# Patient Record
Sex: FEMALE | Race: WHITE | NOT HISPANIC OR LATINO | Employment: FULL TIME | URBAN - METROPOLITAN AREA
[De-identification: names, ages, dates, MRNs, and addresses within clinical notes are randomized per-mention and may not be internally consistent; named-entity substitution may affect disease eponyms.]

---

## 2023-03-05 DIAGNOSIS — K21.9 GASTRO-ESOPHAGEAL REFLUX DISEASE WITHOUT ESOPHAGITIS: ICD-10-CM

## 2023-03-07 RX ORDER — OMEPRAZOLE 40 MG/1
CAPSULE, DELAYED RELEASE ORAL
Qty: 30 CAPSULE | Refills: 5 | Status: SHIPPED | OUTPATIENT
Start: 2023-03-07 | End: 2023-07-24 | Stop reason: SDUPTHER

## 2023-07-24 ENCOUNTER — OFFICE VISIT (OUTPATIENT)
Dept: PRIMARY CARE | Facility: CLINIC | Age: 48
End: 2023-07-24
Payer: COMMERCIAL

## 2023-07-24 VITALS
DIASTOLIC BLOOD PRESSURE: 80 MMHG | BODY MASS INDEX: 28.56 KG/M2 | OXYGEN SATURATION: 97 % | WEIGHT: 182 LBS | TEMPERATURE: 97.7 F | RESPIRATION RATE: 18 BRPM | SYSTOLIC BLOOD PRESSURE: 112 MMHG | HEIGHT: 67 IN | HEART RATE: 91 BPM

## 2023-07-24 DIAGNOSIS — K21.9 GASTRO-ESOPHAGEAL REFLUX DISEASE WITHOUT ESOPHAGITIS: ICD-10-CM

## 2023-07-24 DIAGNOSIS — I49.3 PVC (PREMATURE VENTRICULAR CONTRACTION): ICD-10-CM

## 2023-07-24 DIAGNOSIS — F41.9 ANXIETY: ICD-10-CM

## 2023-07-24 DIAGNOSIS — F32.A DEPRESSION, UNSPECIFIED DEPRESSION TYPE: ICD-10-CM

## 2023-07-24 DIAGNOSIS — N60.02 BREAST CYST, LEFT: ICD-10-CM

## 2023-07-24 DIAGNOSIS — R00.2 HEART PALPITATIONS: Primary | ICD-10-CM

## 2023-07-24 DIAGNOSIS — F41.1 GENERALIZED ANXIETY DISORDER: ICD-10-CM

## 2023-07-24 DIAGNOSIS — M25.50 ARTHRALGIA, UNSPECIFIED JOINT: ICD-10-CM

## 2023-07-24 PROBLEM — I83.813 VARICOSE VEINS OF BOTH LOWER EXTREMITIES WITH PAIN: Status: ACTIVE | Noted: 2023-07-24

## 2023-07-24 PROBLEM — M54.12 CERVICAL RADICULOPATHY: Status: ACTIVE | Noted: 2023-07-24

## 2023-07-24 PROBLEM — N28.1 KIDNEY CYST, ACQUIRED: Status: ACTIVE | Noted: 2023-07-24

## 2023-07-24 PROBLEM — R16.1 SPLENOMEGALY: Status: ACTIVE | Noted: 2023-07-24

## 2023-07-24 PROBLEM — E53.8 VITAMIN B12 DEFICIENCY: Status: ACTIVE | Noted: 2023-07-24

## 2023-07-24 PROBLEM — K76.9 LIVER LESION: Status: ACTIVE | Noted: 2023-07-24

## 2023-07-24 PROBLEM — M54.81 OCCIPITAL NEURALGIA: Status: ACTIVE | Noted: 2023-07-24

## 2023-07-24 PROBLEM — E04.1 THYROID NODULE: Status: ACTIVE | Noted: 2023-07-24

## 2023-07-24 PROBLEM — M62.838 MUSCLE SPASTICITY: Status: ACTIVE | Noted: 2023-07-24

## 2023-07-24 PROCEDURE — 99214 OFFICE O/P EST MOD 30 MIN: CPT | Performed by: FAMILY MEDICINE

## 2023-07-24 PROCEDURE — 1036F TOBACCO NON-USER: CPT | Performed by: FAMILY MEDICINE

## 2023-07-24 RX ORDER — OMEPRAZOLE 40 MG/1
40 CAPSULE, DELAYED RELEASE ORAL DAILY
Qty: 90 CAPSULE | Refills: 1 | Status: SHIPPED | OUTPATIENT
Start: 2023-07-24 | End: 2024-06-07 | Stop reason: SDUPTHER

## 2023-07-24 RX ORDER — VILAZODONE HYDROCHLORIDE 20 MG/1
20 TABLET ORAL DAILY
Qty: 90 TABLET | Refills: 1 | Status: CANCELLED | OUTPATIENT
Start: 2023-07-24 | End: 2024-01-20

## 2023-07-24 RX ORDER — VILAZODONE HYDROCHLORIDE 40 MG/1
40 TABLET ORAL DAILY
Qty: 30 TABLET | Refills: 5 | Status: SHIPPED | OUTPATIENT
Start: 2023-07-24 | End: 2024-02-28

## 2023-07-24 ASSESSMENT — PATIENT HEALTH QUESTIONNAIRE - PHQ9
1. LITTLE INTEREST OR PLEASURE IN DOING THINGS: NOT AT ALL
2. FEELING DOWN, DEPRESSED OR HOPELESS: NOT AT ALL
SUM OF ALL RESPONSES TO PHQ9 QUESTIONS 1 AND 2: 0

## 2023-07-24 NOTE — PROGRESS NOTES
Subjective   Patient ID: Vianney Jacques is a 47 y.o. female who presents for Anxiety, Palpitations, and Joint Pain.    HPI    Patient presents today for anxiety follow up.  Has been taking Viibryd.  Medication is approximately 70-80% effective.  Denies any side effectives to medication.  Requesting refill today.    She would like to discuss palpitations.  She states a month ago, she was to the point of almost passing out.  She states her vitals were taken at work and her BP was slightly elevated but otherwise normal.  States this initial episode lasted approximately 15 seconds.  Then she coughed and she was better.  Since then, when she gets the palpitations she gets lightheaded.  As fast as it comes on, it goes away.  She is drinking enough fluids.  Denies a lot of salt in diet.  Last EKG was 2021.    She has been having a lot of joint pain.  Described as stiffness.  She is not sure if this is do to age.  Denies family history of RA.  If it gets really bad, she will take Aleve or Motrin.    She wanted to discuss mammogram.  She states she was told there was cyst in her breast.  She did have an ultrasound also.  Sister had breast cancer.  BRCA gene negative.  She states where the cysts were identified, she has discomfort.  Left breast.  Denies any lumps felt.  She was told to do a 1 year follow up mammogram.      Review of systems  ; Patient seen today for exam denies any problems with headaches or vision, denies any shortness of breath chest pain nausea or vomiting, no black stool no blood in the stool no heartburn type symptoms denies any problems with constipation or diarrhea, and no dysuria-type symptoms    The patient's allergies medications were reviewed with them today    The patient's social family and surgical history or also reviewed here today, along with her past medical history.     Objective     Alert and active in  no acute distress  HEENT TMs clear oropharynx negative nares clear no drainage noted neck  "supple  With no adenopathy   Heart regular rate and rhythm without murmur and no carotid bruits  Lungs- clear to auscultation bilaterally, no wheeze or rhonchi noted  Thyroid -negative masses or nodularity  Abdomen- soft times four quadrants , bowel sounds positive no masses or organomegaly, negative tenderness guarding or rebound  Neurological exam unremarkable- DTRs in upper and lower extremities within normal limits.   skin -no lesions noted      /80 (BP Location: Left arm, Patient Position: Sitting, BP Cuff Size: Adult)   Pulse 91   Temp 36.5 °C (97.7 °F) (Temporal)   Resp 18   Ht 1.702 m (5' 7\")   Wt 82.6 kg (182 lb)   SpO2 97%   BMI 28.51 kg/m²     No Known Allergies    Assessment/Plan   Problem List Items Addressed This Visit       Anxiety disorder    Arthralgia    Depression    Relevant Medications    vilazodone (Viibryd) 40 mg tablet    PVC (premature ventricular contraction)    Relevant Orders    Referral to Cardiology     Other Visit Diagnoses       Heart palpitations    -  Primary    Gastro-esophageal reflux disease without esophagitis        Relevant Medications    omeprazole (PriLOSEC) 40 mg DR capsule    Breast cyst, left        Relevant Orders    Referral to General Surgery    Anxiety        Relevant Medications    vilazodone (Viibryd) 40 mg tablet        We had a long discussion regarding her palpitations she will monitor years ago and everything was okay but is concerning now that when she gets the palpitations she gets some lightheadedness never had that before her family had PAF but this time she has no signs of A-fib we will have her see cardiology to wear some monitoring    Also little more anxiety with stressors at work at home also her sister having breast cancer now her 29 29-year-old niece has breast cancer    Reviewed her ultrasound and mammogram showing some cysts we will have her get a second opinion from Dr. Mcfarlane I think she will feel better hopefully come in to discuss " whether she should be doing MRIs 1 year mammograms 1 year to be more proactive anything she can do would be helpful, and we look forward to her recommendations      We will have her increase the Viibryd to 40 mg each day and see if that helps somewhat.  The next couple weeks    At this time we reviewed her arthritis panel they are all negative except her ALEISHA which was low she is very active encouraged her to do so    She can try supplement for 4 to 6 weeks if she wants to help with joint pains if they are not helpful can stop it after she let me know how things go over the next few weeks        If anything worsens or changes please call us at once, follow up in the office as planned.

## 2023-09-11 DIAGNOSIS — F32.A DEPRESSION, UNSPECIFIED DEPRESSION TYPE: ICD-10-CM

## 2023-09-11 RX ORDER — VILAZODONE HYDROCHLORIDE 20 MG/1
20 TABLET ORAL DAILY
Qty: 90 TABLET | Refills: 0 | Status: SHIPPED | OUTPATIENT
Start: 2023-09-11 | End: 2023-11-03 | Stop reason: ALTCHOICE

## 2023-10-31 PROBLEM — M41.9 SCOLIOSIS OF THORACIC SPINE: Status: ACTIVE | Noted: 2018-02-20

## 2023-10-31 PROBLEM — M54.2 NECK PAIN: Status: ACTIVE | Noted: 2018-02-20

## 2023-11-03 ENCOUNTER — OFFICE VISIT (OUTPATIENT)
Dept: VASCULAR SURGERY | Facility: CLINIC | Age: 48
End: 2023-11-03
Payer: COMMERCIAL

## 2023-11-03 VITALS
DIASTOLIC BLOOD PRESSURE: 80 MMHG | HEART RATE: 95 BPM | SYSTOLIC BLOOD PRESSURE: 126 MMHG | BODY MASS INDEX: 29.73 KG/M2 | WEIGHT: 185 LBS | HEIGHT: 66 IN

## 2023-11-03 DIAGNOSIS — I83.813 VARICOSE VEINS OF BOTH LOWER EXTREMITIES WITH PAIN: Primary | ICD-10-CM

## 2023-11-03 PROCEDURE — 99203 OFFICE O/P NEW LOW 30 MIN: CPT | Performed by: SURGERY

## 2023-11-03 PROCEDURE — 1036F TOBACCO NON-USER: CPT | Performed by: SURGERY

## 2023-11-03 RX ORDER — LIDOCAINE HCL 4 G/100G
CREAM TOPICAL DAILY PRN
COMMUNITY
Start: 2020-02-04 | End: 2024-01-17 | Stop reason: WASHOUT

## 2023-11-03 RX ORDER — PHENOL 1.4 %
AEROSOL, SPRAY (ML) MUCOUS MEMBRANE
COMMUNITY

## 2023-11-03 RX ORDER — CYANOCOBALAMIN 1000 UG/ML
1 INJECTION, SOLUTION INTRAMUSCULAR; SUBCUTANEOUS
COMMUNITY
Start: 2021-12-07 | End: 2024-01-17 | Stop reason: WASHOUT

## 2023-11-03 ASSESSMENT — ENCOUNTER SYMPTOMS
PSYCHIATRIC NEGATIVE: 1
EYES NEGATIVE: 1
WOUND: 0
GASTROINTESTINAL NEGATIVE: 1
DIZZINESS: 0
HEADACHES: 0
BRUISES/BLEEDS EASILY: 0
NUMBNESS: 0
SPEECH DIFFICULTY: 0
CONSTITUTIONAL NEGATIVE: 1
COUGH: 0
WEAKNESS: 0
BACK PAIN: 0
ENDOCRINE NEGATIVE: 1
COLOR CHANGE: 0
SHORTNESS OF BREATH: 0

## 2023-11-03 NOTE — PROGRESS NOTES
History Of Present Illness  Vianney Jacques is a 48 y.o. female presenting for evaluation of varicose veins of both legs.  She states this has been an issue for several years.  She notes aching and discomfort in her legs that gets worse throughout the day.  She also notes that her legs bother her even when lying down at night.  She denies any swelling or edema.  She notes multiple spider veins and also some prominent varicose veins that occasionally bulge.  She denies any history of DVT.  She does have family history of varicose veins in her mother.  She has no significant past medical history.     Past Medical History  She has a past medical history of Abnormal posture (03/09/2020), Acute maxillary sinusitis, unspecified (06/24/2020), Contact with and (suspected) exposure to covid-19 (09/17/2020), Contusion of unspecified front wall of thorax, initial encounter (02/24/2021), Encounter for immunization (11/16/2020), Encounter for screening for malignant neoplasm of colon (10/01/2021), Other chest pain (06/02/2021), Other conditions influencing health status (09/17/2020), Other specified postprocedural states (11/09/2021), Personal history of other diseases of the musculoskeletal system and connective tissue (12/03/2021), Personal history of other diseases of the musculoskeletal system and connective tissue (03/09/2020), Personal history of other diseases of the musculoskeletal system and connective tissue (03/09/2020), Personal history of other diseases of the respiratory system (06/24/2020), Personal history of other specified conditions (01/20/2021), Personal history of other specified conditions (10/01/2021), Personal history of other specified conditions (01/20/2021), and Personal history of other specified conditions (10/01/2021).    Surgical History  She has a past surgical history that includes Other surgical history (02/04/2020) and Other surgical history (02/04/2020).     Social History  She reports that she  has never smoked. She has never used smokeless tobacco. She reports current alcohol use. She reports that she does not use drugs.    Family History  No family history on file.     Allergies  Patient has no known allergies.    Review of Systems   Constitutional: Negative.    HENT: Negative.     Eyes: Negative.    Respiratory:  Negative for cough and shortness of breath.    Cardiovascular:  Negative for chest pain and leg swelling.   Gastrointestinal: Negative.    Endocrine: Negative.    Genitourinary: Negative.    Musculoskeletal:  Negative for back pain and gait problem.   Skin:  Negative for color change, pallor and wound.   Neurological:  Negative for dizziness, syncope, speech difficulty, weakness, numbness and headaches.   Hematological:  Does not bruise/bleed easily.   Psychiatric/Behavioral: Negative.          Physical Exam  Constitutional:       General: She is not in acute distress.     Appearance: Normal appearance. She is normal weight.   HENT:      Head: Normocephalic and atraumatic.   Eyes:      Extraocular Movements: Extraocular movements intact.      Conjunctiva/sclera: Conjunctivae normal.      Pupils: Pupils are equal, round, and reactive to light.   Neck:      Vascular: No carotid bruit.   Cardiovascular:      Rate and Rhythm: Normal rate and regular rhythm.      Pulses: Normal pulses.      Heart sounds: Normal heart sounds.      Comments: Multiple scattered spider veins and few small varicose veins of bilateral distal thigh and calf  Pulmonary:      Effort: Pulmonary effort is normal.      Breath sounds: Normal breath sounds.   Abdominal:      General: Abdomen is flat. Bowel sounds are normal.      Palpations: Abdomen is soft.   Musculoskeletal:         General: No swelling. Normal range of motion.      Cervical back: Normal range of motion. No tenderness.   Skin:     General: Skin is warm and dry.   Neurological:      General: No focal deficit present.      Mental Status: She is alert and oriented  "to person, place, and time.      Cranial Nerves: No cranial nerve deficit.      Sensory: No sensory deficit.      Motor: No weakness.   Psychiatric:         Mood and Affect: Mood normal.         Behavior: Behavior normal.          Last Recorded Vitals  Blood pressure 126/80, pulse 95, height 1.676 m (5' 6\"), weight 83.9 kg (185 lb).    Relevant Results      Current Outpatient Medications:     cyanocobalamin (Vitamin B-12) 1,000 mcg/mL injection, Inject 1 mL (1,000 mcg) into the muscle., Disp: , Rfl:     lidocaine (lidocaine HCL) 4 % cream, once daily as needed., Disp: , Rfl:     omeprazole (PriLOSEC) 40 mg DR capsule, Take 1 capsule (40 mg) by mouth once daily. Do not crush or chew., Disp: 90 capsule, Rfl: 1    vilazodone (Viibryd) 40 mg tablet, Take 1 tablet (40 mg) by mouth once daily., Disp: 30 tablet, Rfl: 5    APPLE CIDER VINEGAR ORAL, Take by mouth., Disp: , Rfl:     multivitamin with minerals (Adults Multivitamin) tablet, Take by mouth., Disp: , Rfl:       Assessment/Plan   Diagnoses and all orders for this visit:  Varicose veins of both lower extremities with pain  -     Vascular US lower extremity venous insufficiency bilateral; Future      49yo female with varicose veins causing aching and discomfort in bilateral legs. I have recommended that she begin wearing compression stockings to help provide relief. I have given her a prescription to be fitted for the proper size. She is to wear them daily and may take them off at bedtime. I have also recommended leg elevation and daily walking exercise. Will also send her for venous reflux study for further evaluation. She is to follow up in one month to discuss results and further recommendations.       I spent 30 minutes in the professional and overall care of this patient.      Donya Menjivar MD   "

## 2023-12-29 ENCOUNTER — HOSPITAL ENCOUNTER (OUTPATIENT)
Dept: CARDIOLOGY | Facility: HOSPITAL | Age: 48
Discharge: HOME | End: 2023-12-29
Payer: COMMERCIAL

## 2023-12-29 DIAGNOSIS — I83.813 VARICOSE VEINS OF BOTH LOWER EXTREMITIES WITH PAIN: ICD-10-CM

## 2023-12-29 PROCEDURE — 93970 EXTREMITY STUDY: CPT

## 2023-12-29 PROCEDURE — 93970 EXTREMITY STUDY: CPT | Performed by: INTERNAL MEDICINE

## 2024-01-05 ENCOUNTER — APPOINTMENT (OUTPATIENT)
Dept: VASCULAR SURGERY | Facility: CLINIC | Age: 49
End: 2024-01-05
Payer: COMMERCIAL

## 2024-01-17 ENCOUNTER — OFFICE VISIT (OUTPATIENT)
Dept: OBSTETRICS AND GYNECOLOGY | Facility: CLINIC | Age: 49
End: 2024-01-17
Payer: COMMERCIAL

## 2024-01-17 VITALS
DIASTOLIC BLOOD PRESSURE: 90 MMHG | WEIGHT: 187.25 LBS | SYSTOLIC BLOOD PRESSURE: 124 MMHG | HEIGHT: 66 IN | BODY MASS INDEX: 30.09 KG/M2

## 2024-01-17 DIAGNOSIS — Z12.31 VISIT FOR SCREENING MAMMOGRAM: Primary | ICD-10-CM

## 2024-01-17 DIAGNOSIS — Z01.419 WOMEN'S ANNUAL ROUTINE GYNECOLOGICAL EXAMINATION: ICD-10-CM

## 2024-01-17 PROCEDURE — 1036F TOBACCO NON-USER: CPT | Performed by: ADVANCED PRACTICE MIDWIFE

## 2024-01-17 PROCEDURE — 99386 PREV VISIT NEW AGE 40-64: CPT | Performed by: ADVANCED PRACTICE MIDWIFE

## 2024-01-17 ASSESSMENT — PATIENT HEALTH QUESTIONNAIRE - PHQ9
SUM OF ALL RESPONSES TO PHQ9 QUESTIONS 1 & 2: 0
1. LITTLE INTEREST OR PLEASURE IN DOING THINGS: NOT AT ALL
2. FEELING DOWN, DEPRESSED OR HOPELESS: NOT AT ALL

## 2024-01-17 NOTE — PROGRESS NOTES
"Subjective   Vianney Jacques is a 48 y.o. female new patient who is here for a routine annual exam.     Last Pap: 11/10/21-negative, no HPV done.   History of abnormal Pap smear: no  Last mammogram: 2/28/23- Cat 2. Pt was initially concerned re: small left breast cysts noted on mammogram/breast US. Pt was referred to Dr Mcfarlane per PCP, though ultimately opted not to be seen.   History of abnormal mammogram: no  Colonoscopy: Reports PCP ordered Cologuard, though has not yet completed.   PCP: Dr. Freeman   UTI S/S: Denies   Pelvic/Abdominal Pain: Denies   Unusual Vaginal Discharge: Denies   Reports some episodes of urinary urgency and leaking, generally not bothersome.   Occasional hot flashes. Is still having monthly menses. Reports her mother was 55 years old at menopause.     Review of Systems    Objective   /90   Ht 1.676 m (5' 6\")   Wt 84.9 kg (187 lb 4 oz)   LMP 01/02/2024 (Exact Date)   Breastfeeding No   BMI 30.22 kg/m²     Physical Exam  Constitutional:       Appearance: Normal appearance.   Genitourinary:      Vulva, bladder and urethral meatus normal.        Right Adnexa: not tender.     Left Adnexa: not tender.     Uterus is not tender.   Breasts:     Right: Normal.      Left: Normal.   HENT:      Head: Normocephalic.   Pulmonary:      Effort: Pulmonary effort is normal.   Abdominal:      Palpations: Abdomen is soft. There is no mass.      Tenderness: There is no abdominal tenderness.   Musculoskeletal:         General: Normal range of motion.   Neurological:      General: No focal deficit present.      Mental Status: She is alert and oriented to person, place, and time.   Psychiatric:         Mood and Affect: Mood normal.         Behavior: Behavior normal.         Thought Content: Thought content normal.         Judgment: Judgment normal.   Vitals and nursing note reviewed.        Assessment/Plan   Reviewed recommendations regarding frequency of pap screening.   Will be due for next pap in Nov. " 2024, as no HPV screening done on last pap.   Reviewed last mammogram results. Aware of option to see Dr. Mcfarlane, if desired in the future. Also discussed option of fast breast MRI. Pt will alert CNM if order is desired.   Mammogram order placed, to be done after 2/28/24.   Discussed causes of urinary urgency and leaking as well as management options. Encouraged to alert CNM if referral to PFPT is desired (declines at this time).    Discussed/encouraged Cologuard or Colonoscopy.   Discussed diagnosis of menopause, s/s of perimenopause.   RTC in 1 year for annual exam, or sooner if medically necessary.

## 2024-01-24 ENCOUNTER — OFFICE VISIT (OUTPATIENT)
Dept: VASCULAR SURGERY | Facility: CLINIC | Age: 49
End: 2024-01-24
Payer: COMMERCIAL

## 2024-01-24 VITALS
BODY MASS INDEX: 29.89 KG/M2 | SYSTOLIC BLOOD PRESSURE: 122 MMHG | WEIGHT: 186 LBS | HEIGHT: 66 IN | HEART RATE: 95 BPM | DIASTOLIC BLOOD PRESSURE: 74 MMHG

## 2024-01-24 DIAGNOSIS — I83.813 VARICOSE VEINS OF BOTH LOWER EXTREMITIES WITH PAIN: Primary | ICD-10-CM

## 2024-01-24 PROCEDURE — 1036F TOBACCO NON-USER: CPT | Performed by: SURGERY

## 2024-01-24 PROCEDURE — 99213 OFFICE O/P EST LOW 20 MIN: CPT | Performed by: SURGERY

## 2024-01-24 NOTE — PROGRESS NOTES
History Of Present Illness  Vianney Jacques is a 48 y.o. female presenting for follow-up of varicose veins.  She has been wearing compression stockings since her last visit and notes mild improvement.  Venous reflux study reveals only a focal area of reflux in the left saphenofemoral junction within the superficial system.  There is also reflux noted in bilateral common femoral veins.  She continues to deny leg swelling.  However she does get aching at the site of the varicose veins.     Past Medical History  She has a past medical history of Abnormal posture (03/09/2020), Acute maxillary sinusitis, unspecified (06/24/2020), Contact with and (suspected) exposure to covid-19 (09/17/2020), Contusion of unspecified front wall of thorax, initial encounter (02/24/2021), Encounter for immunization (11/16/2020), Encounter for screening for malignant neoplasm of colon (10/01/2021), Other chest pain (06/02/2021), Other conditions influencing health status (09/17/2020), Other specified postprocedural states (11/09/2021), Personal history of other diseases of the musculoskeletal system and connective tissue (12/03/2021), Personal history of other diseases of the musculoskeletal system and connective tissue (03/09/2020), Personal history of other diseases of the musculoskeletal system and connective tissue (03/09/2020), Personal history of other diseases of the respiratory system (06/24/2020), Personal history of other specified conditions (01/20/2021), Personal history of other specified conditions (10/01/2021), Personal history of other specified conditions (01/20/2021), and Personal history of other specified conditions (10/01/2021).    Surgical History  She has a past surgical history that includes Other surgical history (02/04/2020) and Other surgical history (02/04/2020).     Social History  She reports that she has never smoked. She has never used smokeless tobacco. She reports current alcohol use. She reports that she does  "not use drugs.    Family History  No family history on file.     Allergies  Patient has no known allergies.    Review of Systems     Physical Exam  Constitutional:       General: She is not in acute distress.     Appearance: Normal appearance. She is normal weight.   HENT:      Head: Normocephalic and atraumatic.   Eyes:      Extraocular Movements: Extraocular movements intact.      Conjunctiva/sclera: Conjunctivae normal.      Pupils: Pupils are equal, round, and reactive to light.   Neck:      Vascular: No carotid bruit.   Cardiovascular:      Rate and Rhythm: Normal rate and regular rhythm.      Pulses: Normal pulses.      Heart sounds: Normal heart sounds.      Comments: Multiple scattered spider veins and few small varicose veins of bilateral distal thigh and calf  Pulmonary:      Effort: Pulmonary effort is normal.      Breath sounds: Normal breath sounds.   Abdominal:      General: Abdomen is flat. Bowel sounds are normal.      Palpations: Abdomen is soft.   Musculoskeletal:         General: No swelling. Normal range of motion.      Cervical back: Normal range of motion. No tenderness.   Skin:     General: Skin is warm and dry.   Neurological:      General: No focal deficit present.      Mental Status: She is alert and oriented to person, place, and time.      Cranial Nerves: No cranial nerve deficit.      Sensory: No sensory deficit.      Motor: No weakness.   Psychiatric:         Mood and Affect: Mood normal.         Behavior: Behavior normal.          Last Recorded Vitals  Blood pressure 122/74, pulse 95, height 1.676 m (5' 6\"), weight 84.4 kg (186 lb), last menstrual period 01/02/2024, not currently breastfeeding.    Relevant Results      Current Outpatient Medications:     APPLE CIDER VINEGAR ORAL, Take by mouth., Disp: , Rfl:     multivitamin with minerals (Adults Multivitamin) tablet, Take by mouth., Disp: , Rfl:     omeprazole (PriLOSEC) 40 mg DR capsule, Take 1 capsule (40 mg) by mouth once daily. Do " not crush or chew., Disp: 90 capsule, Rfl: 1    vilazodone (Viibryd) 40 mg tablet, Take 1 tablet (40 mg) by mouth once daily., Disp: 30 tablet, Rfl: 5     Vascular US lower extremity venous insufficiency bilateral    Result Date: 12/29/2023            [facility] [address] [place] [address]     Tel [phone] Fax [phone]  Vascular Lab Report  VASC US LOWER EXTREMITY VENOUS INSUFFICIENCY BILATERAL Patient Name:      SAM Lozano Physician:  [id number] Roberto Cooper MD, RPVI Study Date:        12/29/2023          Ordering Provider:  [id number] DANNY CABRERA MRN/PID:           [id number]            Fellow: Accession#:        [id number]        Technologist:       Diana Oquendo RVT Date of Birth/Age: 1975 / 48 years Technologist 2: Gender:            F                   Encounter#:         [id number] Admission Status:  Outpatient          Location Performed: [facility]  Diagnosis/ICD:    Varicose veins of bilateral lower extremities with                   pain-I83.813 Indication:       Varicose veins edema, pain CPT Codes:        12268 Venous reflux study VV VI complete Patient Position: Study performed in a standing position.  Pertinent History: Varicose Veins.  CONCLUSIONS: Right Lower Venous Insufficiency: Right leg demonstrates no evidence of deep vein thrombosis or superficial venous insufficiency. Left Lower Venous Insufficiency: Reflux is noted in the saphenofemoral junction vein. Left leg demonstrates no evidence of deep vein thrombosis.  Imaging & Doppler Findings:  Right            Compress Thrombus   Time SFJ                Yes      None   0.00 sec Prox Thigh GSV     Yes      None   0.00 sec Mid Thigh GSV      Yes      None   0.00 sec Knee GSV           Yes      None   0.00 sec Prox Calf GSV      Yes      None   0.00 sec Mid Calf GSV       Yes       None   0.00 sec Dist Calf GSV      Yes      None   0.00 sec SPJ                Yes      None SSV Prox           Yes      None SSV Mid            Yes      None Common FV                          0.60 sec Mid Femoral Vein                   0.00 sec Popliteal Vein                     0.00 sec  Left             Compress Thrombus  Diam   Depth    Time SFJ                Yes      None   0.9 mm 15.0 mm 1.20 sec Prox Thigh GSV     Yes      None                  0.00 sec Mid Thigh GSV      Yes      None                  0.00 sec Knee GSV           Yes      None                  0.00 sec Prox Calf GSV      Yes      None                  0.00 sec Mid Calf GSV       Yes      None                  0.00 sec Dist Calf GSV      Yes      None                  0.00 sec SPJ                Yes      None SSV Prox           Yes      None SSV Mid            Yes      None Common FV                                         0.70 sec Mid Femoral Vein                                  0.00 sec Popliteal Vein                                    0.00 sec  Right                 Compressible Thrombus        Flow Distal External Iliac                None CFV                       Yes        None         Reflux PFV                       Yes        None FV Proximal               Yes        None FV Mid                    Yes        None   Spontaneous/Phasic FV Distal                 Yes        None Popliteal                 Yes        None   Spontaneous/Phasic Peroneal                  Yes        None PTV                       Yes        None  Left                  Compress Thrombus        Flow Distal External Iliac            None CFV                     Yes      None         Reflux PFV                     Yes      None FV Proximal             Yes      None FV Mid                  Yes      None   Spontaneous/Phasic FV Distal               Yes      None Popliteal               Yes      None   Spontaneous/Phasic Peroneal                Yes      None  PTV                     Yes      None  [id number] Roberto Cooper MD, GINI Electronically signed by [id number] Roberto Cooper MD, GINI on 12/29/2023 at 11:21:46 AM  ** Final **          Assessment/Plan   Diagnoses and all orders for this visit:  Varicose veins of both lower extremities with pain      49yo female with symptomatic spider varicose veins of bilateral lower extremities.  Venous reflux study reveals only focal areas of reflux, but there is no indication for any ablative procedures.  She is still interested in having the varicose veins themselves treated.  I have recommended referral to Dr. Daniel at the [facility] for evaluation for sclerotherapy.  She should also continue to wear compression stockings daily and elevate her legs while at rest.  She may follow-up in the vascular office as needed.       I spent 20 minutes in the professional and overall care of this patient.      Donya Menjivar MD

## 2024-02-25 DIAGNOSIS — F32.A DEPRESSION, UNSPECIFIED DEPRESSION TYPE: ICD-10-CM

## 2024-02-25 DIAGNOSIS — F41.9 ANXIETY: ICD-10-CM

## 2024-02-28 RX ORDER — VILAZODONE HYDROCHLORIDE 40 MG/1
40 TABLET ORAL DAILY
Qty: 30 TABLET | Refills: 0 | Status: SHIPPED | OUTPATIENT
Start: 2024-02-28 | End: 2024-02-29 | Stop reason: SDUPTHER

## 2024-02-28 NOTE — TELEPHONE ENCOUNTER
Patient aware she needs appointment. mPowa message sent.    ----- Message from Vianney Jacques sent at 2/28/2024 11:45 AM EST -----  Regarding: Refill  Contact: [phone]  Can you please send a refill of vilazodone 40 mg tablet to [facility] in [place].   Thank You!

## 2024-02-29 DIAGNOSIS — F41.9 ANXIETY: ICD-10-CM

## 2024-02-29 DIAGNOSIS — F32.A DEPRESSION, UNSPECIFIED DEPRESSION TYPE: ICD-10-CM

## 2024-02-29 RX ORDER — VILAZODONE HYDROCHLORIDE 40 MG/1
40 TABLET ORAL DAILY
Qty: 90 TABLET | Refills: 0 | Status: SHIPPED | OUTPATIENT
Start: 2024-02-29 | End: 2024-05-28

## 2024-02-29 NOTE — TELEPHONE ENCOUNTER
Rx Refill Request Telephone Encounter    Name:  Vianney Jacques  :  [id number]  Medication Name: vilazodone (Viibryd) 40 mg tablet     Specific Pharmacy location: [facility]  Date of last appointment:  2023  Date of next appointment: 3/7/2024  Best number to reach patient:  [phone]    PHARMACY CALLED. THEY NEED PRESCRIPTION. PATIENT INSURANCE WILL ONLY COVER 90 DAY OF THIS . THANKS

## 2024-03-06 PROBLEM — Z00.00 ROUTINE GENERAL MEDICAL EXAMINATION AT A HEALTH CARE FACILITY: Status: ACTIVE | Noted: 2024-03-06

## 2024-03-06 NOTE — PROGRESS NOTES
"Subjective   Patient ID: Vianney Jacques is a 48 y.o. female who presents for Annual Exam.  HPI  Annual physical   Eats a generally healthy diet   Staying active   Denies any chest pain,SOB  No Abdominal pain   No black or bloody stools   Urination/BM normal   Last eye apt 1 year  Last dental apt 6 month  Last Gyn apt 1/2024  No new family h/o cancers or heart disease       No other concern / question   Review of systems  ; Patient seen today for exam denies any problems with headaches or vision, denies any shortness of breath chest pain nausea or vomiting, no black stool no blood in the stool no heartburn type symptoms denies any problems with constipation or diarrhea, and no dysuria-type symptoms    The patient's allergies medications were reviewed with them today    The patient's social family and surgical history or also reviewed here today, along with her past medical history.     Objective     Alert and active in  no acute distress  HEENT TMs clear oropharynx negative nares clear no drainage noted neck supple  With no adenopathy   Heart regular rate and rhythm without murmur and no carotid bruits  Lungs- clear to auscultation bilaterally, no wheeze or rhonchi noted  Thyroid -negative masses or nodularity  Abdomen- soft times four quadrants , bowel sounds positive no masses or organomegaly, negative tenderness guarding or rebound  Neurological exam unremarkable- DTRs in upper and lower extremities within normal limits.   skin -no lesions noted      /70 (BP Location: Right arm, Patient Position: Sitting, BP Cuff Size: Adult)   Pulse 88   Temp 36.5 °C (97.7 °F) (Temporal)   Resp 16   Ht 1.676 m (5' 6\")   Wt 86.2 kg (190 lb)   SpO2 98%   BMI 30.67 kg/m²     No Known Allergies    Assessment/Plan   Problem List Items Addressed This Visit       Thyroid nodule    Relevant Orders    Tsh With Reflex To Free T4 If Abnormal    Vitamin D 25-Hydroxy,Total (for eval of Vitamin D levels)    Vitamin B12 deficiency    " Routine general medical examination at a health care facility - Primary    Relevant Orders    Lipid Panel    CBC and Auto Differential    Comprehensive Metabolic Panel    BMI 30.0-30.9,adult     Other Visit Diagnoses       Vitamin D deficiency        Relevant Orders    Vitamin D 25-Hydroxy,Total (for eval of Vitamin D levels)        We discussed weight loss issues at length will look into Bib's semaglutide program I cannot recommend Adipex to her as she has had palpitations in the past and doing well on her Prilosec and Viibryd    Discussed her health maintenance otherwise doing really well      If anything worsens or changes please call us at once, follow up in the office as planned,

## 2024-03-07 ENCOUNTER — OFFICE VISIT (OUTPATIENT)
Dept: PRIMARY CARE | Facility: CLINIC | Age: 49
End: 2024-03-07
Payer: COMMERCIAL

## 2024-03-07 VITALS
SYSTOLIC BLOOD PRESSURE: 102 MMHG | HEART RATE: 88 BPM | OXYGEN SATURATION: 98 % | BODY MASS INDEX: 30.53 KG/M2 | TEMPERATURE: 97.7 F | HEIGHT: 66 IN | RESPIRATION RATE: 16 BRPM | WEIGHT: 190 LBS | DIASTOLIC BLOOD PRESSURE: 70 MMHG

## 2024-03-07 DIAGNOSIS — Z00.00 ROUTINE GENERAL MEDICAL EXAMINATION AT A HEALTH CARE FACILITY: Primary | ICD-10-CM

## 2024-03-07 DIAGNOSIS — E55.9 VITAMIN D DEFICIENCY: ICD-10-CM

## 2024-03-07 DIAGNOSIS — E04.1 THYROID NODULE: ICD-10-CM

## 2024-03-07 DIAGNOSIS — E53.8 VITAMIN B12 DEFICIENCY: ICD-10-CM

## 2024-03-07 PROCEDURE — 1036F TOBACCO NON-USER: CPT | Performed by: FAMILY MEDICINE

## 2024-03-07 PROCEDURE — 3008F BODY MASS INDEX DOCD: CPT | Performed by: FAMILY MEDICINE

## 2024-03-07 PROCEDURE — 99396 PREV VISIT EST AGE 40-64: CPT | Performed by: FAMILY MEDICINE

## 2024-03-08 ENCOUNTER — LAB (OUTPATIENT)
Dept: LAB | Facility: LAB | Age: 49
End: 2024-03-08
Payer: COMMERCIAL

## 2024-03-08 DIAGNOSIS — E04.1 THYROID NODULE: ICD-10-CM

## 2024-03-08 DIAGNOSIS — E55.9 VITAMIN D DEFICIENCY: ICD-10-CM

## 2024-03-08 DIAGNOSIS — Z00.00 ROUTINE GENERAL MEDICAL EXAMINATION AT A HEALTH CARE FACILITY: ICD-10-CM

## 2024-03-08 LAB
25(OH)D3 SERPL-MCNC: 35 NG/ML (ref 30–100)
ALBUMIN SERPL BCP-MCNC: 4.4 G/DL (ref 3.4–5)
ALP SERPL-CCNC: 57 U/L (ref 33–110)
ALT SERPL W P-5'-P-CCNC: 15 U/L (ref 7–45)
ANION GAP SERPL CALC-SCNC: 11 MMOL/L (ref 10–20)
AST SERPL W P-5'-P-CCNC: 14 U/L (ref 9–39)
BASOPHILS # BLD AUTO: 0.04 X10*3/UL (ref 0–0.1)
BASOPHILS NFR BLD AUTO: 0.7 %
BILIRUB SERPL-MCNC: 0.6 MG/DL (ref 0–1.2)
BUN SERPL-MCNC: 14 MG/DL (ref 6–23)
CALCIUM SERPL-MCNC: 9.5 MG/DL (ref 8.6–10.3)
CHLORIDE SERPL-SCNC: 103 MMOL/L (ref 98–107)
CHOLEST SERPL-MCNC: 189 MG/DL (ref 0–199)
CHOLESTEROL/HDL RATIO: 2.3
CO2 SERPL-SCNC: 25 MMOL/L (ref 21–32)
CREAT SERPL-MCNC: 0.74 MG/DL (ref 0.5–1.05)
EGFRCR SERPLBLD CKD-EPI 2021: >90 ML/MIN/1.73M*2
EOSINOPHIL # BLD AUTO: 0.05 X10*3/UL (ref 0–0.7)
EOSINOPHIL NFR BLD AUTO: 0.9 %
ERYTHROCYTE [DISTWIDTH] IN BLOOD BY AUTOMATED COUNT: 15.5 % (ref 11.5–14.5)
GLUCOSE SERPL-MCNC: 93 MG/DL (ref 74–99)
HCT VFR BLD AUTO: 38.1 % (ref 36–46)
HDLC SERPL-MCNC: 81 MG/DL
HGB BLD-MCNC: 11.8 G/DL (ref 12–16)
IMM GRANULOCYTES # BLD AUTO: 0.02 X10*3/UL (ref 0–0.7)
IMM GRANULOCYTES NFR BLD AUTO: 0.4 % (ref 0–0.9)
LDLC SERPL CALC-MCNC: 90 MG/DL
LYMPHOCYTES # BLD AUTO: 1.62 X10*3/UL (ref 1.2–4.8)
LYMPHOCYTES NFR BLD AUTO: 29.2 %
MCH RBC QN AUTO: 25.5 PG (ref 26–34)
MCHC RBC AUTO-ENTMCNC: 31 G/DL (ref 32–36)
MCV RBC AUTO: 82 FL (ref 80–100)
MONOCYTES # BLD AUTO: 0.52 X10*3/UL (ref 0.1–1)
MONOCYTES NFR BLD AUTO: 9.4 %
NEUTROPHILS # BLD AUTO: 3.3 X10*3/UL (ref 1.2–7.7)
NEUTROPHILS NFR BLD AUTO: 59.4 %
NON HDL CHOLESTEROL: 108 MG/DL (ref 0–149)
NRBC BLD-RTO: 0 /100 WBCS (ref 0–0)
PLATELET # BLD AUTO: 187 X10*3/UL (ref 150–450)
POTASSIUM SERPL-SCNC: 4.3 MMOL/L (ref 3.5–5.3)
PROT SERPL-MCNC: 6.7 G/DL (ref 6.4–8.2)
RBC # BLD AUTO: 4.63 X10*6/UL (ref 4–5.2)
SODIUM SERPL-SCNC: 135 MMOL/L (ref 136–145)
TRIGL SERPL-MCNC: 90 MG/DL (ref 0–149)
TSH SERPL-ACNC: 1.54 MIU/L (ref 0.44–3.98)
VLDL: 18 MG/DL (ref 0–40)
WBC # BLD AUTO: 5.6 X10*3/UL (ref 4.4–11.3)

## 2024-03-08 PROCEDURE — 36415 COLL VENOUS BLD VENIPUNCTURE: CPT

## 2024-03-08 PROCEDURE — 80061 LIPID PANEL: CPT

## 2024-03-08 PROCEDURE — 85025 COMPLETE CBC W/AUTO DIFF WBC: CPT

## 2024-03-08 PROCEDURE — 84443 ASSAY THYROID STIM HORMONE: CPT

## 2024-03-08 PROCEDURE — 82306 VITAMIN D 25 HYDROXY: CPT

## 2024-03-08 PROCEDURE — 80053 COMPREHEN METABOLIC PANEL: CPT

## 2024-03-11 ENCOUNTER — TELEPHONE (OUTPATIENT)
Dept: PRIMARY CARE | Facility: CLINIC | Age: 49
End: 2024-03-11
Payer: COMMERCIAL

## 2024-03-11 DIAGNOSIS — Z12.11 SPECIAL SCREENING FOR MALIGNANT NEOPLASM OF COLON: ICD-10-CM

## 2024-03-11 NOTE — TELEPHONE ENCOUNTER
Patient is aware.     Lucien Freeman, DO  You26 minutes ago (4:48 PM)       Those change all the time but like I said she has that slight anemia, is just a normal variant

## 2024-03-11 NOTE — TELEPHONE ENCOUNTER
----- Message from Lucien Freeman, DO sent at 3/11/2024  4:36 PM EDT -----  Her labs are looking okay, her sodium is a little bit low probably from her Viibryd,, also slightly anemic so she needs to take a multivitamin every day and needs to make sure she does her Cologuard,, or as we discussed would rather do a colonoscopy can do that 1 way or another that needs to be done, due to any anemia

## 2024-03-11 NOTE — TELEPHONE ENCOUNTER
Patient is aware. Colonoscopy order placed. She is wondering why her MCH, and MCHC are low? Please advise.

## 2024-04-05 ENCOUNTER — HOSPITAL ENCOUNTER (OUTPATIENT)
Dept: RADIOLOGY | Facility: HOSPITAL | Age: 49
Discharge: HOME | End: 2024-04-05
Payer: COMMERCIAL

## 2024-04-05 VITALS — WEIGHT: 185 LBS | HEIGHT: 66 IN | BODY MASS INDEX: 29.73 KG/M2

## 2024-04-05 DIAGNOSIS — Z12.31 VISIT FOR SCREENING MAMMOGRAM: ICD-10-CM

## 2024-04-05 PROCEDURE — 77063 BREAST TOMOSYNTHESIS BI: CPT | Performed by: RADIOLOGY

## 2024-04-05 PROCEDURE — 77067 SCR MAMMO BI INCL CAD: CPT

## 2024-04-05 PROCEDURE — 77067 SCR MAMMO BI INCL CAD: CPT | Performed by: RADIOLOGY

## 2024-04-08 ENCOUNTER — ANESTHESIA EVENT (OUTPATIENT)
Dept: GASTROENTEROLOGY | Facility: EXTERNAL LOCATION | Age: 49
End: 2024-04-08

## 2024-04-11 ENCOUNTER — TELEPHONE (OUTPATIENT)
Dept: OBSTETRICS AND GYNECOLOGY | Facility: CLINIC | Age: 49
End: 2024-04-11
Payer: COMMERCIAL

## 2024-04-11 DIAGNOSIS — R92.30 DENSE BREAST TISSUE: ICD-10-CM

## 2024-04-11 NOTE — TELEPHONE ENCOUNTER
----- Message from Essence Vasquez MA sent at 4/11/2024  9:30 AM EDT -----  lmtcb  ----- Message -----  From: ELEAZAR Knox-CNM  Sent: 4/10/2024   5:07 PM EDT  To: [id number] Clinical Support Staff    Please advise Vianney that her mammogram was WNL, cat 2. Recommendation is for continued yearly screening mammography.   The bilateral benign cysts were again noted though do not appear suspicious.   The breast tissue is extremely dense. We had talked at her visit about optional fast breast MRI. Pt to alert CNM if order is desired.

## 2024-04-11 NOTE — TELEPHONE ENCOUNTER
----- Message from Essence Vasquez MA sent at 4/11/2024  9:30 AM EDT -----  lmtcb  ----- Message -----  From: ELEAZAR Knox-CNM  Sent: 4/10/2024   5:07 PM EDT  To: [id number] Clinical Support Staff    Please advise Vianney that her mammogram was WNL, cat 2. Recommendation is for continued yearly screening mammography.   The bilateral benign cysts were again noted though do not appear suspicious.   The breast tissue is extremely dense. We had talked at her visit about optional fast breast MRI. Pt to alert CNM if order is desired.

## 2024-04-19 ENCOUNTER — ANESTHESIA (OUTPATIENT)
Dept: GASTROENTEROLOGY | Facility: EXTERNAL LOCATION | Age: 49
End: 2024-04-19

## 2024-04-19 ENCOUNTER — HOSPITAL ENCOUNTER (OUTPATIENT)
Dept: GASTROENTEROLOGY | Facility: EXTERNAL LOCATION | Age: 49
Discharge: HOME | End: 2024-04-19
Payer: COMMERCIAL

## 2024-04-19 VITALS
HEART RATE: 85 BPM | RESPIRATION RATE: 17 BRPM | BODY MASS INDEX: 28.93 KG/M2 | HEIGHT: 66 IN | TEMPERATURE: 97.3 F | OXYGEN SATURATION: 98 % | DIASTOLIC BLOOD PRESSURE: 68 MMHG | WEIGHT: 180 LBS | SYSTOLIC BLOOD PRESSURE: 100 MMHG

## 2024-04-19 DIAGNOSIS — Z12.11 SPECIAL SCREENING FOR MALIGNANT NEOPLASM OF COLON: Primary | ICD-10-CM

## 2024-04-19 PROCEDURE — 45380 COLONOSCOPY AND BIOPSY: CPT | Performed by: INTERNAL MEDICINE

## 2024-04-19 PROCEDURE — 88305 TISSUE EXAM BY PATHOLOGIST: CPT

## 2024-04-19 PROCEDURE — 88305 TISSUE EXAM BY PATHOLOGIST: CPT | Performed by: PATHOLOGY

## 2024-04-19 RX ORDER — SODIUM CHLORIDE, SODIUM LACTATE, POTASSIUM CHLORIDE, CALCIUM CHLORIDE 600; 310; 30; 20 MG/100ML; MG/100ML; MG/100ML; MG/100ML
20 INJECTION, SOLUTION INTRAVENOUS CONTINUOUS
Status: DISCONTINUED | OUTPATIENT
Start: 2024-04-19 | End: 2024-04-20 | Stop reason: HOSPADM

## 2024-04-19 RX ORDER — LIDOCAINE HYDROCHLORIDE 20 MG/ML
INJECTION, SOLUTION INFILTRATION; PERINEURAL AS NEEDED
Status: DISCONTINUED | OUTPATIENT
Start: 2024-04-19 | End: 2024-04-19

## 2024-04-19 RX ORDER — SODIUM CHLORIDE 9 MG/ML
INJECTION, SOLUTION INTRAVENOUS CONTINUOUS PRN
Status: DISCONTINUED | OUTPATIENT
Start: 2024-04-19 | End: 2024-04-19

## 2024-04-19 RX ORDER — PROPOFOL 10 MG/ML
INJECTION, EMULSION INTRAVENOUS AS NEEDED
Status: DISCONTINUED | OUTPATIENT
Start: 2024-04-19 | End: 2024-04-19

## 2024-04-19 RX ADMIN — PROPOFOL 100 MG: 10 INJECTION, EMULSION INTRAVENOUS at 10:44

## 2024-04-19 RX ADMIN — PROPOFOL 20 MG: 10 INJECTION, EMULSION INTRAVENOUS at 10:54

## 2024-04-19 RX ADMIN — PROPOFOL 20 MG: 10 INJECTION, EMULSION INTRAVENOUS at 10:56

## 2024-04-19 RX ADMIN — PROPOFOL 20 MG: 10 INJECTION, EMULSION INTRAVENOUS at 10:50

## 2024-04-19 RX ADMIN — PROPOFOL 20 MG: 10 INJECTION, EMULSION INTRAVENOUS at 10:48

## 2024-04-19 RX ADMIN — PROPOFOL 50 MG: 10 INJECTION, EMULSION INTRAVENOUS at 10:47

## 2024-04-19 RX ADMIN — PROPOFOL 20 MG: 10 INJECTION, EMULSION INTRAVENOUS at 10:52

## 2024-04-19 RX ADMIN — LIDOCAINE HYDROCHLORIDE 2 ML: 20 INJECTION, SOLUTION INFILTRATION; PERINEURAL at 10:44

## 2024-04-19 RX ADMIN — SODIUM CHLORIDE: 9 INJECTION, SOLUTION INTRAVENOUS at 10:40

## 2024-04-19 RX ADMIN — PROPOFOL 50 MG: 10 INJECTION, EMULSION INTRAVENOUS at 10:45

## 2024-04-19 RX ADMIN — PROPOFOL 50 MG: 10 INJECTION, EMULSION INTRAVENOUS at 10:46

## 2024-04-19 ASSESSMENT — COLUMBIA-SUICIDE SEVERITY RATING SCALE - C-SSRS
1. IN THE PAST MONTH, HAVE YOU WISHED YOU WERE DEAD OR WISHED YOU COULD GO TO SLEEP AND NOT WAKE UP?: NO
2. HAVE YOU ACTUALLY HAD ANY THOUGHTS OF KILLING YOURSELF?: NO
6. HAVE YOU EVER DONE ANYTHING, STARTED TO DO ANYTHING, OR PREPARED TO DO ANYTHING TO END YOUR LIFE?: NO

## 2024-04-19 ASSESSMENT — PAIN - FUNCTIONAL ASSESSMENT
PAIN_FUNCTIONAL_ASSESSMENT: 0-10

## 2024-04-19 ASSESSMENT — PAIN SCALES - GENERAL
PAINLEVEL_OUTOF10: 0 - NO PAIN
PAINLEVEL_OUTOF10: 0 - NO PAIN
PAIN_LEVEL: 0
PAINLEVEL_OUTOF10: 0 - NO PAIN

## 2024-04-19 NOTE — DISCHARGE INSTRUCTIONS
Patient Instructions after a Colonoscopy      The anesthetics, sedatives or narcotics which were given to you today will be acting in your body for the next 24 hours, so you might feel a little sleepy or groggy.  This feeling should slowly wear off. Carefully read and follow the instructions.     You received sedation today:  - Do not drive or operate any machinery or power tools of any kind.   - No alcoholic beverages today, not even beer or wine.  - Do not make any important decisions or sign any legal documents.  - No over the counter medications that contain alcohol or that may cause drowsiness.  - Do not make any important decisions or sign any legal documents.    While it is common to experience mild to moderate abdominal distention, gas, or belching after your procedure, if any of these symptoms occur following discharge from the GI Lab or within one week of having your procedure, call the [facility] to be advised whether a visit to your nearest Urgent Care or Emergency Department is indicated.  Take this paper with you if you go.     - If you develop an allergic reaction to the medications that were given during your procedure such as difficulty breathing, rash, hives, severe nausea, vomiting or lightheadedness.  - If you experience chest pain, shortness of breath, severe abdominal pain, fevers and chills.  -If you develop signs and symptoms of bleeding such as blood in your spit, if your stools turn black, tarry, or bloody  - If you have not urinated within 8 hours following your procedure.  - If your IV site becomes painful, red, inflamed, or looks infected.    If you received a biopsy/polypectomy/sphincterotomy the following instructions apply below:    __ Do not use Aspirin containing products, non-steroidal medications or anti-coagulants for one week following your procedure. (Examples of these types of medications are: Advil, Arthrotec, Aleve, Coumadin, Ecotrin, Heparin, Ibuprofen,  Indocin, Motrin, Naprosyn, Nuprin, Plavix, Vioxx, and Voltarin, or their generic forms.  This list is not all-inclusive.  Check with your physician or pharmacist before resuming medications.)   __ Eat a soft diet today.  Avoid foods that are poorly digested for the next 24 hours.  These foods would include: nuts, beans, lettuce, red meats, and fried foods. Start with liquids and advance your diet as tolerated, gradually work up to eating solids.   __ Do not have a Barium Study or Enema for one week.    Your physician recommends the additional following instructions:    -You have a contact number available for emergencies. The signs and symptoms of potential delayed complications were discussed with you. You may return to normal activities tomorrow.  -Resume your previous diet.  -Continue your present medications.   -We are waiting for your pathology results.  -Your physician has recommended a repeat colonoscopy (date to be determined after pending pathology results are reviewed) for surveillance based on pathology results.  -The findings and recommendations have been discussed with you.  -The findings and recommendations were discussed with your family.  - Please see Medication Reconciliation Form for new medication/medications prescribed.       If you experience any problems or have any questions following discharge from the GI Lab, please call:        Nurse Signature                                                                        Date___________________                                                                            Patient/Responsible Party Signature                                        Date___________________

## 2024-04-19 NOTE — ANESTHESIA PREPROCEDURE EVALUATION
Patient: Vianney Jacques    Procedure Information       Date/Time: 04/19/24 1120    Scheduled providers: Chuck Garcia MD    Procedure: COLONOSCOPY    Location: [facility]            Relevant Problems   Cardiac   (+) PVC (premature ventricular contraction)      Neuro   (+) Anxiety disorder   (+) Cervical radiculopathy   (+) Depression   (+) Occipital neuralgia      GI   (+) Chronic GERD      Liver   (+) Liver lesion      Musculoskeletal   (+) Idiopathic scoliosis and kyphoscoliosis   (+) Scoliosis of thoracic spine       Clinical information reviewed:    Allergies  Meds               NPO Detail:  No data recorded     Physical Exam    Airway  Mallampati: II  TM distance: >3 FB  Neck ROM: full     Cardiovascular - normal exam     Dental - normal exam     Pulmonary - normal exam     Abdominal - normal exam         Anesthesia Plan    History of general anesthesia?: yes  History of complications of general anesthesia?: no    ASA 3     MAC     intravenous induction   Anesthetic plan and risks discussed with patient.

## 2024-04-19 NOTE — ANESTHESIA POSTPROCEDURE EVALUATION
Patient: Vianney Jacques    Procedure Summary       Date: 04/19/24 Room / Location: [facility]    Anesthesia Start: 1040 Anesthesia Stop: 1104    Procedure: COLONOSCOPY Diagnosis:       Special screening for malignant neoplasm of colon      Special screening for malignant neoplasm of colon    Scheduled Providers: Chuck Garcia MD Responsible Provider: SOWMYA Escobar    Anesthesia Type: MAC ASA Status: 3            Anesthesia Type: MAC    Vitals Value Taken Time   BP 98/62 04/19/24 1104   Temp 36.3 04/19/24 1104   Pulse 81 04/19/24 1104   Resp 16 04/19/24 1104   SpO2 96 04/19/24 1104       Anesthesia Post Evaluation    Patient location during evaluation: PACU  Patient participation: waiting for patient participation  Level of consciousness: responsive to physical stimuli  Pain score: 0  Pain management: adequate  Airway patency: patent  Cardiovascular status: blood pressure returned to baseline  Respiratory status: acceptable  Hydration status: acceptable  Postoperative Nausea and Vomiting: none      No notable events documented.

## 2024-04-19 NOTE — PRE-SEDATION DOCUMENTATION
Patient: Vianney Jacques  MRN: [id number]    Pre-sedation Evaluation:  Sedation necessary for: Immobility and Analgesia  Requesting service: GI SERVICE     History of Present Illness: SCREENING COLONOSCOPY      Past Medical History:   Diagnosis Date    Abnormal posture 03/09/2020    Abnormal posture    Acute maxillary sinusitis, unspecified 06/24/2020    Acute non-recurrent maxillary sinusitis    Contact with and (suspected) exposure to covid-19 09/17/2020    Exposure to COVID-19 virus    Contusion of unspecified front wall of thorax, initial encounter 02/24/2021    Rib contusion    Encounter for immunization 11/16/2020    Encounter for immunization    Encounter for screening for malignant neoplasm of colon 10/01/2021    Colon cancer screening    Other chest pain 06/02/2021    Atypical chest pain    Other conditions influencing health status 09/17/2020    History of cough    Other specified postprocedural states 11/09/2021    History of Papanicolaou smear    Personal history of other diseases of the musculoskeletal system and connective tissue 12/03/2021    History of joint pain    Personal history of other diseases of the musculoskeletal system and connective tissue 03/09/2020    History of muscle weakness    Personal history of other diseases of the musculoskeletal system and connective tissue 03/09/2020    History of neck pain    Personal history of other diseases of the respiratory system 06/24/2020    History of acute pharyngitis    Personal history of other specified conditions 01/20/2021    History of dizziness    Personal history of other specified conditions 10/01/2021    History of palpitations    Personal history of other specified conditions 01/20/2021    History of headache    Personal history of other specified conditions 10/01/2021    History of fatigue       Principle problems:  Patient Active Problem List    Diagnosis Date Noted    BMI 30.0-30.9,adult 03/07/2024    Routine general medical examination at a  health care facility 03/06/2024    Anxiety disorder 07/24/2023    Arthralgia 07/24/2023    Cervical radiculopathy 07/24/2023    Chronic GERD 07/24/2023    Depression 07/24/2023    Kidney cyst, acquired 07/24/2023    Liver lesion 07/24/2023    Muscle spasticity 07/24/2023    Occipital neuralgia 07/24/2023    PVC (premature ventricular contraction) 07/24/2023    Splenomegaly 07/24/2023    Thyroid nodule 07/24/2023    Varicose veins of both lower extremities with pain 07/24/2023    Vitamin B12 deficiency 07/24/2023    Neck pain 02/20/2018    Scoliosis of thoracic spine 02/20/2018    Palpitations 08/16/2016    Ganglion cyst 11/02/2012    Idiopathic scoliosis and kyphoscoliosis 05/20/2005    Lumbago 08/16/2003     Allergies:  No Known Allergies  PTA/Current Medications:  (Not in a hospital admission)    Current Outpatient Medications   Medication Sig Dispense Refill    APPLE CIDER VINEGAR ORAL Take by mouth.      MAGNESIUM CITRATE ORAL Take 1 tablet by mouth once daily.      multivitamin with minerals (Adults Multivitamin) tablet Take by mouth.      omeprazole (PriLOSEC) 40 mg DR capsule Take 1 capsule (40 mg) by mouth once daily. Do not crush or chew. 90 capsule 1    vilazodone (Viibryd) 40 mg tablet Take 1 tablet (40 mg) by mouth once daily. 90 tablet 0     No current facility-administered medications for this encounter.     Past Surgical History:   has a past surgical history that includes Other surgical history (02/04/2020) and Other surgical history (02/04/2020).    Recent sedation/surgery (24 hours) No    Review of Systems:  Please check all that apply: No significant medical history    Pregnancy test completed prior to procedure on any menstruating female: none        NPO guidelines met: Yes    Physical Exam    Airway  Mallampati: II     Cardiovascular   Rhythm: regular  Rate: normal     Dental    Pulmonary - normal exam  Breath sounds clear to auscultation         Plan    ASA 3     Moderate

## 2024-04-30 LAB
LABORATORY COMMENT REPORT: NORMAL
PATH REPORT.FINAL DX SPEC: NORMAL
PATH REPORT.GROSS SPEC: NORMAL
PATH REPORT.TOTAL CANCER: NORMAL
RESIDENT REVIEW: NORMAL

## 2024-05-26 DIAGNOSIS — F41.9 ANXIETY: ICD-10-CM

## 2024-05-26 DIAGNOSIS — F32.A DEPRESSION, UNSPECIFIED DEPRESSION TYPE: ICD-10-CM

## 2024-05-28 RX ORDER — VILAZODONE HYDROCHLORIDE 40 MG/1
40 TABLET ORAL DAILY
Qty: 90 TABLET | Refills: 0 | Status: SHIPPED | OUTPATIENT
Start: 2024-05-28 | End: 2024-06-07 | Stop reason: SDUPTHER

## 2024-06-07 ENCOUNTER — OFFICE VISIT (OUTPATIENT)
Dept: PRIMARY CARE | Facility: CLINIC | Age: 49
End: 2024-06-07
Payer: COMMERCIAL

## 2024-06-07 VITALS
TEMPERATURE: 96.9 F | HEIGHT: 66 IN | OXYGEN SATURATION: 99 % | HEART RATE: 98 BPM | WEIGHT: 188.4 LBS | SYSTOLIC BLOOD PRESSURE: 102 MMHG | BODY MASS INDEX: 30.28 KG/M2 | DIASTOLIC BLOOD PRESSURE: 70 MMHG | RESPIRATION RATE: 16 BRPM

## 2024-06-07 DIAGNOSIS — M25.50 ARTHRALGIA, UNSPECIFIED JOINT: ICD-10-CM

## 2024-06-07 DIAGNOSIS — E66.09 CLASS 1 OBESITY DUE TO EXCESS CALORIES WITHOUT SERIOUS COMORBIDITY WITH BODY MASS INDEX (BMI) OF 30.0 TO 30.9 IN ADULT: ICD-10-CM

## 2024-06-07 DIAGNOSIS — M77.12 LATERAL EPICONDYLITIS OF LEFT ELBOW: ICD-10-CM

## 2024-06-07 DIAGNOSIS — K21.9 GASTRO-ESOPHAGEAL REFLUX DISEASE WITHOUT ESOPHAGITIS: ICD-10-CM

## 2024-06-07 DIAGNOSIS — F32.A DEPRESSION, UNSPECIFIED DEPRESSION TYPE: ICD-10-CM

## 2024-06-07 DIAGNOSIS — F41.9 ANXIETY: ICD-10-CM

## 2024-06-07 PROCEDURE — 99214 OFFICE O/P EST MOD 30 MIN: CPT | Performed by: FAMILY MEDICINE

## 2024-06-07 PROCEDURE — 1036F TOBACCO NON-USER: CPT | Performed by: FAMILY MEDICINE

## 2024-06-07 PROCEDURE — 3008F BODY MASS INDEX DOCD: CPT | Performed by: FAMILY MEDICINE

## 2024-06-07 RX ORDER — VILAZODONE HYDROCHLORIDE 40 MG/1
40 TABLET ORAL DAILY
Qty: 90 TABLET | Refills: 1 | Status: SHIPPED | OUTPATIENT
Start: 2024-06-07

## 2024-06-07 RX ORDER — OMEPRAZOLE 40 MG/1
40 CAPSULE, DELAYED RELEASE ORAL DAILY
Qty: 90 CAPSULE | Refills: 1 | Status: SHIPPED | OUTPATIENT
Start: 2024-06-07

## 2024-06-07 RX ORDER — SEMAGLUTIDE 0.25 MG/.5ML
0.25 INJECTION, SOLUTION SUBCUTANEOUS
Qty: 2 ML | Refills: 0 | Status: SHIPPED | OUTPATIENT
Start: 2024-06-09 | End: 2024-07-01

## 2024-06-07 NOTE — PROGRESS NOTES
"Subjective   Patient ID: Vianney Jacques is a 48 y.o. female who presents for Joint Pain.    HPI    Patient presents today for joint pain. Located in right middle finger, knees and toes. Ongoing x 2 month ago. Describes the pain as very sensitive. Has tried Motrin minimal relief. She is having pain in the left elbow as well. States this is painful to the touch. She states her toes will swell sometimes.     No other concern /question     Review of systems  ; Patient seen today for exam denies any problems with headaches or vision, denies any shortness of breath chest pain nausea or vomiting, no black stool no blood in the stool no heartburn type symptoms denies any problems with constipation or diarrhea, and no dysuria-type symptoms    The patient's allergies medications were reviewed with them today    The patient's social family and surgical history or also reviewed here today, along with her past medical history.     Objective     Alert and active in  no acute distress  HEENT TMs clear oropharynx negative nares clear no drainage noted neck supple  With no adenopathy   Heart regular rate and rhythm without murmur and no carotid bruits  Lungs- clear to auscultation bilaterally, no wheeze or rhonchi noted  Thyroid -negative masses or nodularity  Abdomen- soft times four quadrants , bowel sounds positive no masses or organomegaly, negative tenderness guarding or rebound  Neurological exam unremarkable- DTRs in upper and lower extremities within normal limits.   skin -no lesions noted//left lateral elbow slight tenderness extensor tendon  Right middle finger consistent with slight Arst arthritis at the DIP joint    /70 (BP Location: Right arm, Patient Position: Sitting, BP Cuff Size: Large adult)   Pulse 98   Temp 36.1 °C (96.9 °F) (Temporal)   Resp 16   Ht 1.676 m (5' 6\")   Wt 85.5 kg (188 lb 6.4 oz)   SpO2 99%   BMI 30.41 kg/m²     No Known Allergies    Assessment/Plan   Problem List Items Addressed This " Visit       Arthralgia    Relevant Orders    Arthritis Panel (CMS)    Depression    Relevant Medications    vilazodone (Viibryd) 40 mg tablet    BMI 30.0-30.9,adult    Relevant Medications    semaglutide, weight loss, (Wegovy) 0.25 mg/0.5 mL pen injector (Start on 6/9/2024)     Other Visit Diagnoses       Gastro-esophageal reflux disease without esophagitis        Relevant Medications    omeprazole (PriLOSEC) 40 mg DR capsule    Anxiety        Relevant Medications    vilazodone (Viibryd) 40 mg tablet    Class 1 obesity due to excess calories without serious comorbidity with body mass index (BMI) of 30.0 to 30.9 in adult        Relevant Medications    semaglutide, weight loss, (Wegovy) 0.25 mg/0.5 mL pen injector (Start on 6/9/2024)    Lateral epicondylitis of left elbow              Discussed patient's BMI and to institute calorie reduction and increase exercise to decrease risk of diabetes and heart disease in the future.    Refill Omeprazole, Viibryd.    Discussed Wegovy including side effects and benefits.   Demonstrated today.   Wegovy prescribed today.     If this is too expensive at the drug store, we can send Semaglutide to [facility]'s.     Labs have been ordered, she/he will have these performed and we will contact her/him with results.  (Arthritis Panel)    If anything worsens or changes please call us at once, follow up in the office as planned.    Scribe Attestation  By signing my name below, I, Ale Hancock MA, Scribe   attest that this documentation has been prepared under the direction and in the presence of Lucien Freeman DO.

## 2024-06-13 ENCOUNTER — APPOINTMENT (OUTPATIENT)
Dept: PRIMARY CARE | Facility: CLINIC | Age: 49
End: 2024-06-13
Payer: COMMERCIAL

## 2024-06-21 ENCOUNTER — LAB (OUTPATIENT)
Dept: LAB | Facility: LAB | Age: 49
End: 2024-06-21
Payer: COMMERCIAL

## 2024-06-21 DIAGNOSIS — M25.50 ARTHRALGIA, UNSPECIFIED JOINT: ICD-10-CM

## 2024-06-21 LAB
ERYTHROCYTE [SEDIMENTATION RATE] IN BLOOD BY WESTERGREN METHOD: <1 MM/H (ref 0–20)
RHEUMATOID FACT SER NEPH-ACNC: <10 IU/ML (ref 0–15)
URATE SERPL-MCNC: 5.1 MG/DL (ref 2.3–6.7)

## 2024-06-21 PROCEDURE — 86431 RHEUMATOID FACTOR QUANT: CPT

## 2024-06-21 PROCEDURE — 36415 COLL VENOUS BLD VENIPUNCTURE: CPT

## 2024-06-21 PROCEDURE — 85652 RBC SED RATE AUTOMATED: CPT

## 2024-06-21 PROCEDURE — 84550 ASSAY OF BLOOD/URIC ACID: CPT

## 2024-06-21 PROCEDURE — 86038 ANTINUCLEAR ANTIBODIES: CPT

## 2024-06-25 LAB — ANA SER QL HEP2 SUBST: NEGATIVE

## 2024-07-10 DIAGNOSIS — E66.09 CLASS 1 OBESITY DUE TO EXCESS CALORIES WITHOUT SERIOUS COMORBIDITY WITH BODY MASS INDEX (BMI) OF 30.0 TO 30.9 IN ADULT: ICD-10-CM

## 2024-07-10 RX ORDER — SEMAGLUTIDE 0.5 MG/.5ML
0.5 INJECTION, SOLUTION SUBCUTANEOUS
Qty: 2 ML | Refills: 1 | Status: SHIPPED | OUTPATIENT
Start: 2024-07-14

## 2024-08-08 DIAGNOSIS — E66.09 CLASS 1 OBESITY DUE TO EXCESS CALORIES WITHOUT SERIOUS COMORBIDITY WITH BODY MASS INDEX (BMI) OF 30.0 TO 30.9 IN ADULT: ICD-10-CM

## 2024-08-08 RX ORDER — SEMAGLUTIDE 1 MG/.5ML
1 INJECTION, SOLUTION SUBCUTANEOUS
Qty: 2 ML | Refills: 1 | Status: SHIPPED | OUTPATIENT
Start: 2024-08-11

## 2024-08-08 NOTE — TELEPHONE ENCOUNTER
I gave my self the last 10mg injection and so far no side effects and lost 7lbs. Do I continue to request refills or is it automatically sent monthly to the pharmacy?     Thank you,  Vianney Jacques

## 2024-09-06 DIAGNOSIS — E66.09 CLASS 1 OBESITY DUE TO EXCESS CALORIES WITHOUT SERIOUS COMORBIDITY WITH BODY MASS INDEX (BMI) OF 30.0 TO 30.9 IN ADULT: ICD-10-CM

## 2024-09-06 RX ORDER — SEMAGLUTIDE 1 MG/.5ML
1 INJECTION, SOLUTION SUBCUTANEOUS
Qty: 2 ML | Refills: 0 | Status: SHIPPED | OUTPATIENT
Start: 2024-09-08

## 2024-09-20 ENCOUNTER — OFFICE VISIT (OUTPATIENT)
Dept: PRIMARY CARE | Facility: CLINIC | Age: 49
End: 2024-09-20
Payer: COMMERCIAL

## 2024-09-20 VITALS
WEIGHT: 179.4 LBS | DIASTOLIC BLOOD PRESSURE: 68 MMHG | HEIGHT: 66 IN | TEMPERATURE: 97.2 F | SYSTOLIC BLOOD PRESSURE: 100 MMHG | BODY MASS INDEX: 28.83 KG/M2 | RESPIRATION RATE: 16 BRPM | OXYGEN SATURATION: 96 % | HEART RATE: 96 BPM

## 2024-09-20 DIAGNOSIS — Z76.89 ENCOUNTER FOR WEIGHT MANAGEMENT: ICD-10-CM

## 2024-09-20 DIAGNOSIS — F41.1 GENERALIZED ANXIETY DISORDER: Primary | ICD-10-CM

## 2024-09-20 DIAGNOSIS — E66.09 CLASS 1 OBESITY DUE TO EXCESS CALORIES WITHOUT SERIOUS COMORBIDITY WITH BODY MASS INDEX (BMI) OF 30.0 TO 30.9 IN ADULT: ICD-10-CM

## 2024-09-20 DIAGNOSIS — F32.9 REACTIVE DEPRESSION: ICD-10-CM

## 2024-09-20 PROCEDURE — 99213 OFFICE O/P EST LOW 20 MIN: CPT | Performed by: FAMILY MEDICINE

## 2024-09-20 PROCEDURE — 3008F BODY MASS INDEX DOCD: CPT | Performed by: FAMILY MEDICINE

## 2024-09-20 PROCEDURE — 1036F TOBACCO NON-USER: CPT | Performed by: FAMILY MEDICINE

## 2024-09-20 RX ORDER — SEMAGLUTIDE 1 MG/.5ML
1 INJECTION, SOLUTION SUBCUTANEOUS
Qty: 2 ML | Refills: 1 | Status: SHIPPED | OUTPATIENT
Start: 2024-09-22

## 2024-09-20 RX ORDER — MULTIVITAMIN/IRON/FOLIC ACID 18MG-0.4MG
1 TABLET ORAL DAILY
COMMUNITY

## 2024-09-20 RX ORDER — ACETAMINOPHEN 500 MG
TABLET ORAL DAILY
COMMUNITY

## 2024-09-20 ASSESSMENT — PATIENT HEALTH QUESTIONNAIRE - PHQ9
SUM OF ALL RESPONSES TO PHQ9 QUESTIONS 1 AND 2: 0
1. LITTLE INTEREST OR PLEASURE IN DOING THINGS: NOT AT ALL
2. FEELING DOWN, DEPRESSED OR HOPELESS: NOT AT ALL

## 2024-09-20 NOTE — PROGRESS NOTES
"Subjective   Patient ID: Vianney Jacques is a 49 y.o. female who presents for Weight Management.  Mood and anxiety    HPI    Patient presents today for weight management. She is currently taking Wegovy 1 mg.  Last in office weight was 188 lb. Today's in office weight is 179 lbs.  She has lost approximately 9 lbs. Admits she has had constipation.  She has had leg cramps in the middle of the night, but she has not had enough fluids those days. She is eating a healthy diet. She is exercising.       Mood and anxiety has been doing well with the Viibryd no changes    Declines flu vaccine today.    Review of systems  ; Patient seen today for exam denies any problems with headaches or vision, denies any shortness of breath chest pain nausea or vomiting, no black stool no blood in the stool no heartburn type symptoms denies any problems with constipation or diarrhea, and no dysuria-type symptoms    The patient's allergies medications were reviewed with them today    The patient's social family and surgical history or also reviewed here today, along with her past medical history.     Objective     Alert and active in  no acute distress  HEENT TMs clear oropharynx negative nares clear no drainage noted neck supple  With no adenopathy   Heart regular rate and rhythm without murmur and no carotid bruits  Lungs- clear to auscultation bilaterally, no wheeze or rhonchi noted  Thyroid -negative masses or nodularity  Abdomen- soft times four quadrants , bowel sounds positive no masses or organomegaly, negative tenderness guarding or rebound  Neurological exam unremarkable- DTRs in upper and lower extremities within normal limits.   skin -no lesions noted      /68 (BP Location: Right arm, Patient Position: Sitting, BP Cuff Size: Adult)   Pulse 96   Temp 36.2 °C (97.2 °F) (Temporal)   Resp 16   Ht 1.676 m (5' 6\")   Wt 81.4 kg (179 lb 6.4 oz)   SpO2 96%   BMI 28.96 kg/m²     No Known Allergies    Assessment/Plan   Problem List " Items Addressed This Visit       Anxiety disorder - Primary    Depression    BMI 30.0-30.9,adult    Relevant Medications    semaglutide, weight loss, (Wegovy) 1 mg/0.5 mL pen injector (Start on 9/22/2024)     Other Visit Diagnoses       Encounter for weight management        BMI 28.0-28.9,adult        Class 1 obesity due to excess calories without serious comorbidity with body mass index (BMI) of 30.0 to 30.9 in adult        Relevant Medications    semaglutide, weight loss, (Wegovy) 1 mg/0.5 mL pen injector (Start on 9/22/2024)        She is feeling better occasional cramps she will add some magnesium    We discussed when to increase dosing she has been on 4 weeks of Wegovy 1 mg we will do another month she does have a refill she is doing well and feeling full she is continuing to lose weight//we will keep her going in the right direction      She will see me in 3 months or if any problems give us a call if we need to increase the dose as discussed      If anything worsens or changes please call us at once, follow up in the office as planned,

## 2024-09-27 ENCOUNTER — APPOINTMENT (OUTPATIENT)
Dept: PRIMARY CARE | Facility: CLINIC | Age: 49
End: 2024-09-27
Payer: COMMERCIAL

## 2024-10-04 DIAGNOSIS — E66.811 CLASS 1 OBESITY DUE TO EXCESS CALORIES WITHOUT SERIOUS COMORBIDITY WITH BODY MASS INDEX (BMI) OF 30.0 TO 30.9 IN ADULT: ICD-10-CM

## 2024-10-04 DIAGNOSIS — E66.09 CLASS 1 OBESITY DUE TO EXCESS CALORIES WITHOUT SERIOUS COMORBIDITY WITH BODY MASS INDEX (BMI) OF 30.0 TO 30.9 IN ADULT: ICD-10-CM

## 2024-10-04 RX ORDER — SEMAGLUTIDE 1 MG/.5ML
1 INJECTION, SOLUTION SUBCUTANEOUS
Qty: 2 ML | Refills: 2 | Status: SHIPPED | OUTPATIENT
Start: 2024-10-06

## 2024-10-04 NOTE — TELEPHONE ENCOUNTER
Could you please send the prescription to [facility] in [place] because [facility] is out of the wengalaynay  is currently on back order at [facility]. Thank you

## 2024-11-28 ENCOUNTER — PATIENT MESSAGE (OUTPATIENT)
Dept: PRIMARY CARE | Facility: CLINIC | Age: 49
End: 2024-11-28
Payer: COMMERCIAL

## 2024-11-29 RX ORDER — SEMAGLUTIDE 1.7 MG/.75ML
1.7 INJECTION, SOLUTION SUBCUTANEOUS WEEKLY
Qty: 3 ML | Refills: 0 | Status: SHIPPED | OUTPATIENT
Start: 2024-11-29 | End: 2024-12-21

## 2024-12-27 ENCOUNTER — OFFICE VISIT (OUTPATIENT)
Dept: URGENT CARE | Age: 49
End: 2024-12-27
Payer: COMMERCIAL

## 2024-12-27 ENCOUNTER — APPOINTMENT (OUTPATIENT)
Dept: URGENT CARE | Age: 49
End: 2024-12-27
Payer: COMMERCIAL

## 2024-12-27 ENCOUNTER — ANCILLARY PROCEDURE (OUTPATIENT)
Dept: URGENT CARE | Age: 49
End: 2024-12-27
Payer: COMMERCIAL

## 2024-12-27 VITALS
DIASTOLIC BLOOD PRESSURE: 63 MMHG | OXYGEN SATURATION: 98 % | BODY MASS INDEX: 27.16 KG/M2 | WEIGHT: 169 LBS | HEIGHT: 66 IN | HEART RATE: 109 BPM | TEMPERATURE: 97.7 F | SYSTOLIC BLOOD PRESSURE: 110 MMHG | RESPIRATION RATE: 16 BRPM

## 2024-12-27 DIAGNOSIS — M79.675 TOE PAIN, LEFT: Primary | ICD-10-CM

## 2024-12-27 DIAGNOSIS — M79.675 TOE PAIN, LEFT: ICD-10-CM

## 2024-12-27 PROCEDURE — 1036F TOBACCO NON-USER: CPT | Performed by: FAMILY MEDICINE

## 2024-12-27 PROCEDURE — 73630 X-RAY EXAM OF FOOT: CPT | Mod: LEFT SIDE | Performed by: FAMILY MEDICINE

## 2024-12-27 PROCEDURE — 99203 OFFICE O/P NEW LOW 30 MIN: CPT | Performed by: FAMILY MEDICINE

## 2024-12-27 PROCEDURE — 3008F BODY MASS INDEX DOCD: CPT | Performed by: FAMILY MEDICINE

## 2024-12-27 ASSESSMENT — ENCOUNTER SYMPTOMS
JOINT SWELLING: 1
WOUND: 0
FEVER: 0
COLOR CHANGE: 1
WHEEZING: 0
CHILLS: 0
NUMBNESS: 0
MYALGIAS: 0
ARTHRALGIAS: 1
COUGH: 0
CHEST TIGHTNESS: 0
WEAKNESS: 0
SHORTNESS OF BREATH: 0

## 2024-12-27 NOTE — PATIENT INSTRUCTIONS
Follow up with your Orthopedic doctor ASAP.  OTC meds as needed.  Maintain Post-Op shoe use.   denies pain/discomfort

## 2024-12-27 NOTE — PROGRESS NOTES
Subjective   Patient ID: Vianney Jacques is a 49 y.o. female. They present today with a chief complaint of Injury (4th digit left foot. ).    History of Present Illness    History provided by:  Patient   used: No    Injury  Location:  Left 4th toe  Quality:  Throbbing  Severity:  Severe (7/10)  Onset quality:  Sudden  Duration:  3 days (12/24/24)  Timing:  Constant  Progression:  Unchanged  Chronicity:  New  Associated symptoms: no chest pain, no cough, no fever, no myalgias, no shortness of breath and no wheezing        Past Medical History  Allergies as of 12/27/2024    (No Known Allergies)       (Not in a hospital admission)       Past Medical History:   Diagnosis Date    Abnormal posture 03/09/2020    Abnormal posture    Acute maxillary sinusitis, unspecified 06/24/2020    Acute non-recurrent maxillary sinusitis    Contact with and (suspected) exposure to covid-19 09/17/2020    Exposure to COVID-19 virus    Contusion of unspecified front wall of thorax, initial encounter 02/24/2021    Rib contusion    Encounter for immunization 11/16/2020    Encounter for immunization    Encounter for screening for malignant neoplasm of colon 10/01/2021    Colon cancer screening    GERD (gastroesophageal reflux disease)     Other chest pain 06/02/2021    Atypical chest pain    Other conditions influencing health status 09/17/2020    History of cough    Other specified postprocedural states 11/09/2021    History of Papanicolaou smear    Personal history of other diseases of the musculoskeletal system and connective tissue 12/03/2021    History of joint pain    Personal history of other diseases of the musculoskeletal system and connective tissue 03/09/2020    History of muscle weakness    Personal history of other diseases of the musculoskeletal system and connective tissue 03/09/2020    History of neck pain    Personal history of other diseases of the respiratory system 06/24/2020    History of acute pharyngitis  "   Personal history of other specified conditions 2021    History of dizziness    Personal history of other specified conditions 10/01/2021    History of palpitations    Personal history of other specified conditions 2021    History of headache    Personal history of other specified conditions 10/01/2021    History of fatigue       Past Surgical History:   Procedure Laterality Date    OTHER SURGICAL HISTORY  2020     section    OTHER SURGICAL HISTORY  2020    Bunionectomy        reports that she has never smoked. She has never used smokeless tobacco. She reports current alcohol use. She reports that she does not use drugs.    Review of Systems  Review of Systems   Constitutional:  Negative for chills and fever.   Respiratory:  Negative for cough, chest tightness, shortness of breath and wheezing.    Cardiovascular:  Negative for chest pain.   Musculoskeletal:  Positive for arthralgias and joint swelling. Negative for myalgias.   Skin:  Positive for color change. Negative for wound.   Neurological:  Negative for weakness and numbness.                                  Objective    Vitals:    24 1426   BP: 110/63   BP Location: Left arm   Patient Position: Sitting   Pulse: 109   Resp: 16   Temp: 36.5 °C (97.7 °F)   TempSrc: Oral   SpO2: 98%   Weight: 76.7 kg (169 lb)   Height: 1.676 m (5' 6\")     No LMP recorded.    Physical Exam  Constitutional:       General: She is not in acute distress.     Appearance: Normal appearance.   Cardiovascular:      Rate and Rhythm: Normal rate and regular rhythm.      Heart sounds: No murmur heard.     No friction rub.   Pulmonary:      Effort: Pulmonary effort is normal. No respiratory distress.      Breath sounds: No wheezing, rhonchi or rales.   Musculoskeletal:         General: Swelling, tenderness and signs of injury present.   Neurological:      Mental Status: She is alert.     X-ray wet read shows a spiral fracture of the proximal phalanx " left 4th toe    Procedures    Point of Care Test & Imaging Results from this visit  No results found for this visit on 12/27/24.   No results found.    Diagnostic study results (if any) were reviewed by Elías Turcios DO.    Assessment/Plan   Allergies, medications, history, and pertinent labs/EKGs/Imaging reviewed by Elías Turcios DO.     Orders and Diagnoses  There are no diagnoses linked to this encounter.    Medical Admin Record      Patient disposition: Home    Electronically signed by Elías Turcios DO  2:36 PM

## 2025-01-02 RX ORDER — SEMAGLUTIDE 1.7 MG/.75ML
1.7 INJECTION, SOLUTION SUBCUTANEOUS WEEKLY
Qty: 3 ML | Refills: 1 | Status: SHIPPED | OUTPATIENT
Start: 2025-01-02 | End: 2025-02-21

## 2025-01-02 NOTE — TELEPHONE ENCOUNTER
Last seen 9/20/24  Medication pended  PLEASE ADVISE ABOUT HEART RATE    Vianney GARCES Do [id number] [id number] Clinical Support Staff (supporting Lucien Freeman DO)2 minutes ago (9:43 AM)       [place],        I am requesting a refill of Wegovy be sent to [facility]. Also I wanted to mention that I noticed that my resting heart rate has been in the 90's , I know this can be a side effect of the medication.     Thank You,  Vianney

## 2025-01-07 ENCOUNTER — HOSPITAL ENCOUNTER (OUTPATIENT)
Dept: RADIOLOGY | Facility: CLINIC | Age: 50
Discharge: HOME | End: 2025-01-07
Payer: COMMERCIAL

## 2025-01-07 ENCOUNTER — OFFICE VISIT (OUTPATIENT)
Dept: ORTHOPEDIC SURGERY | Facility: CLINIC | Age: 50
End: 2025-01-07
Payer: COMMERCIAL

## 2025-01-07 VITALS — BODY MASS INDEX: 27 KG/M2 | HEIGHT: 66 IN | WEIGHT: 168 LBS

## 2025-01-07 DIAGNOSIS — M79.672 LEFT FOOT PAIN: ICD-10-CM

## 2025-01-07 DIAGNOSIS — S92.512A CLOSED FRACTURE OF PROXIMAL PHALANX OF LESSER TOE OF LEFT FOOT, INITIAL ENCOUNTER: Primary | ICD-10-CM

## 2025-01-07 PROCEDURE — 28510 TREATMENT OF TOE FRACTURE: CPT | Performed by: FAMILY MEDICINE

## 2025-01-07 PROCEDURE — 3008F BODY MASS INDEX DOCD: CPT | Performed by: FAMILY MEDICINE

## 2025-01-07 PROCEDURE — 73630 X-RAY EXAM OF FOOT: CPT | Mod: LT

## 2025-01-07 PROCEDURE — L4361 PNEUMA/VAC WALK BOOT PRE OTS: HCPCS | Performed by: FAMILY MEDICINE

## 2025-01-07 PROCEDURE — L3260 AMBULATORY SURGICAL BOOT EAC: HCPCS | Performed by: FAMILY MEDICINE

## 2025-01-07 PROCEDURE — 99203 OFFICE O/P NEW LOW 30 MIN: CPT | Performed by: FAMILY MEDICINE

## 2025-01-07 PROCEDURE — 1036F TOBACCO NON-USER: CPT | Performed by: FAMILY MEDICINE

## 2025-01-07 PROCEDURE — 99213 OFFICE O/P EST LOW 20 MIN: CPT | Mod: 57,25 | Performed by: FAMILY MEDICINE

## 2025-01-07 PROCEDURE — 73630 X-RAY EXAM OF FOOT: CPT | Mod: LEFT SIDE | Performed by: FAMILY MEDICINE

## 2025-01-07 ASSESSMENT — PATIENT HEALTH QUESTIONNAIRE - PHQ9
1. LITTLE INTEREST OR PLEASURE IN DOING THINGS: NOT AT ALL
SUM OF ALL RESPONSES TO PHQ9 QUESTIONS 1 AND 2: 0
2. FEELING DOWN, DEPRESSED OR HOPELESS: NOT AT ALL

## 2025-01-07 NOTE — PROGRESS NOTES
Acute Injury New Patient Visit    CC:   Chief Complaint   Patient presents with    Left Foot - Pain     Left foot 4th toe, xrays at [facility]       HPI: Vianney is a 49 y.o.female who presents today with new complaints of left foot fourth toe fracture.  She presents here today for further evaluation she had injured her foot on 27 December 10 days ago where she was diagnosed at the hospital with a mildly displaced and angulated left fourth toe fracture.  They did not tape or provide her any support she tells me.  She was able to utilize a walking shoe that her  picked up at the drugstore.  She still having pain and discomfort lots of swelling and feels that the toe is flopping around        Review of Systems   GENERAL: Negative for malaise, significant weight loss, fever  MUSCULOSKELETAL: See HPI  NEURO: Negative for numbness / tingling     Past Medical History  Past Medical History:   Diagnosis Date    Abnormal posture 03/09/2020    Abnormal posture    Acute maxillary sinusitis, unspecified 06/24/2020    Acute non-recurrent maxillary sinusitis    Contact with and (suspected) exposure to covid-19 09/17/2020    Exposure to COVID-19 virus    Contusion of unspecified front wall of thorax, initial encounter 02/24/2021    Rib contusion    Encounter for immunization 11/16/2020    Encounter for immunization    Encounter for screening for malignant neoplasm of colon 10/01/2021    Colon cancer screening    GERD (gastroesophageal reflux disease)     Other chest pain 06/02/2021    Atypical chest pain    Other conditions influencing health status 09/17/2020    History of cough    Other specified postprocedural states 11/09/2021    History of Papanicolaou smear    Personal history of other diseases of the musculoskeletal system and connective tissue 12/03/2021    History of joint pain    Personal history of other diseases of the musculoskeletal system and connective tissue 03/09/2020    History of muscle weakness     Personal history of other diseases of the musculoskeletal system and connective tissue 03/09/2020    History of neck pain    Personal history of other diseases of the respiratory system 06/24/2020    History of acute pharyngitis    Personal history of other specified conditions 01/20/2021    History of dizziness    Personal history of other specified conditions 10/01/2021    History of palpitations    Personal history of other specified conditions 01/20/2021    History of headache    Personal history of other specified conditions 10/01/2021    History of fatigue       Medication review  Medication Documentation Review Audit       Reviewed by Luci Raza MA (Medical Assistant) on 01/07/25 at 1312      Medication Order Taking? Sig Documenting Provider Last Dose Status   APPLE CIDER VINEGAR ORAL [id number] No Take by mouth. Historical Provider, MD Taking Active   b complex 0.4 mg tablet [id number] No Take 1 tablet by mouth once daily. Historical Provider, MD Taking Active   cholecalciferol (Vitamin D3) 50 mcg (2,000 unit) capsule [id number] No Take by mouth once daily. Historical Provider, MD Taking Active   MAGNESIUM CITRATE ORAL [id number] No Take 1 tablet by mouth once daily. Historical Provider, MD Taking Active   multivitamin with minerals (Adults Multivitamin) tablet [id number] No Take by mouth. Historical Provider, MD Taking Active   omeprazole (PriLOSEC) 40 mg DR capsule [id number] No Take 1 capsule (40 mg) by mouth once daily. Do not crush or chew. Lucien Freeman DO Taking Active     Discontinued 01/02/25 1242   semaglutide, weight loss, (Wegovy) 1.7 mg/0.75 mL pen injector [id number]  Inject 1.7 mg under the skin 1 (one) time per week for 8 doses. Lucien Freeman DO  Active   vilazodone (Viibryd) 40 mg tablet [id number] No Take 1 tablet (40 mg) by mouth once daily. Lucien Freeman DO Taking Active                    Allergies  No Known Allergies    Social History  Social History     Socioeconomic History     Marital status:      Spouse name: Not on file    Number of children: Not on file    Years of education: Not on file    Highest education level: Not on file   Occupational History    Not on file   Tobacco Use    Smoking status: Never    Smokeless tobacco: Never   Vaping Use    Vaping status: Never Used   Substance and Sexual Activity    Alcohol use: Yes     Comment: Socially    Drug use: Never    Sexual activity: Defer   Other Topics Concern    Not on file   Social History Narrative    Not on file     Social Drivers of Health     Financial Resource Strain: Not on file   Food Insecurity: Not on file   Transportation Needs: Not on file   Physical Activity: Not on file   Stress: Not on file   Social Connections: Not on file   Intimate Partner Violence: Not on file   Housing Stability: Not on file       Surgical History  Past Surgical History:   Procedure Laterality Date    OTHER SURGICAL HISTORY  2020     section    OTHER SURGICAL HISTORY  2020    Bunionectomy       Physical Exam:  GENERAL:  Patient is awake, alert, and oriented to person place and time.  Patient appears well nourished and well kept.  Affect Calm, Not Acutely Distressed.  HEENT:  Normocephalic, Atraumatic, EOMI  CARDIOVASCULAR:  Hemodynamically stable.  RESPIRATORY:  Normal respirations with unlabored breathing.  NEURO: Gross sensation intact to the lower extremities bilaterally.  Extremity: Left foot exam demonstrates skin which is warm pink well-perfused swelling fullness and some mild bruising about the fourth toe.  There is a little bit of external rotation seen about the digit.  When standing and placing full weightbearing on this is more pronounced and there is elevation and laterality to the digit crossing over the fifth toe.      Diagnostics: Previous imaging reviewed, repeat x-rays today demonstrate stable appearing minimally comminuted and angulated left fourth toe fracture  XR foot left 3+ views          Interpreted  By:  Budinsky, Cole,   STUDY:  XR FOOT LEFT 3+ VIEWS; ;  1/7/2025 2:14 pm      INDICATION:  Signs/Symptoms:pain.      ACCESSION NUMBER(S):  [id number]      ORDERING CLINICIAN:  COLE BUDINSKY      IMPRESSION:  Repeat left foot films demonstrate stable appearing minimally  angulated and comminuted obliquely oriented proximal 4th toe fracture.          Signed by: Cole Budinsky 1/7/2025 6:23 PM  Dictation workstation:   [id number]             Procedure: None  Procedures    Assessment:   Problem List Items Addressed This Visit    None  Visit Diagnoses       Closed fracture of proximal phalanx of lesser toe of left foot, initial encounter    -  Primary    Relevant Orders    Post-op shoe    Walking Boot Short    Left foot pain        Relevant Orders    XR foot left 3+ views (Completed)             Plan: At this time discussed with the patient that since I am able to very easily recorrect the deformity of the toe with just a small amount of pressure lateral to medially, we recommend adrian taping often in addition to recommending postop shoe and/or boot to provide comfort protection and support.  Recommended she utilize vitamin D supplementation to assist with fracture healing.  No indication to numb the toe and reduce the fracture as again the fracture is very easily read to supple into a very normal anatomic appearance with a simple amount of lateral pressure.  She should call or return sooner with any issues.  Plan to see her back in 3 weeks for repeat evaluation repeat x-rays 3 views of the left foot at that time.  Should she had a [place] or have more concerns for reinjury she should call or return sooner.  Orders Placed This Encounter    Post-op shoe    Walking Boot Short    XR foot left 3+ views      At the conclusion of the visit there were no further questions by the patient/family regarding their plan of care.  Patient was instructed to call or return with any issues, questions, or concerns regarding their  injury and/or treatment plan described above.     01/07/25 at 11:24 PM - Cole C Budinsky, MD    Office: [phone]    This note was prepared using voice recognition software.  The details of this note are correct and have been reviewed, and corrected to the best of my ability.  Some grammatical errors may persist related to the Dragon software.

## 2025-01-22 ENCOUNTER — HOSPITAL ENCOUNTER (OUTPATIENT)
Dept: RADIOLOGY | Facility: CLINIC | Age: 50
Discharge: HOME | End: 2025-01-22
Payer: COMMERCIAL

## 2025-01-22 ENCOUNTER — OFFICE VISIT (OUTPATIENT)
Dept: ORTHOPEDIC SURGERY | Facility: CLINIC | Age: 50
End: 2025-01-22
Payer: COMMERCIAL

## 2025-01-22 DIAGNOSIS — M79.672 LEFT FOOT PAIN: ICD-10-CM

## 2025-01-22 DIAGNOSIS — S92.512A CLOSED FRACTURE OF PROXIMAL PHALANX OF LESSER TOE OF LEFT FOOT, INITIAL ENCOUNTER: ICD-10-CM

## 2025-01-22 PROCEDURE — 1036F TOBACCO NON-USER: CPT | Performed by: FAMILY MEDICINE

## 2025-01-22 PROCEDURE — 73630 X-RAY EXAM OF FOOT: CPT | Mod: LT

## 2025-01-22 PROCEDURE — 99024 POSTOP FOLLOW-UP VISIT: CPT | Performed by: FAMILY MEDICINE

## 2025-01-22 PROCEDURE — 99211 OFF/OP EST MAY X REQ PHY/QHP: CPT | Performed by: FAMILY MEDICINE

## 2025-01-22 NOTE — PROGRESS NOTES
Established Patient Follow-Up Visit    CC:   Chief Complaint   Patient presents with    Left Foot - Pain     Closed fx of proximal phalanx of lesser toe of left foot        HPI:  Vianney is a 49 y.o. female returns here today for follow-up visit regarding: Following a left fourth proximal toe fracture.  She is been compliant with her vitamin D supplementation, adrian tape and postop shoe.  Looking forward to upcoming trip to the [facility] in several weeks.          REVIEW OF SYSTEMS:  GENERAL: Negative for malaise, significant weight loss, fever  MUSCULOSKELETAL: See HPI  NEURO: Negative for numbness / tingling       PHYSICAL EXAM:  -Neuro: Gross sensation intact to the lower extremities bilaterally.  -Extremity: Left foot demonstrate skin which is warm pink well-perfused tenderness palpation at the proximal fourth toe there is very subtle angulation but no rotational deformity when standing placing full weightbearing the toe does not elevate or lift off to the side like it was previously.  Adequate alignment noted remainder the left lower extremity neurovasc intact benign    IMAGING: Repeat x-rays today demonstrate stable appearing comminuted obliquely oriented proximal fourth toe fracture with interval increase in sclerotic callus remission      PROCEDURE: None  Procedures     ASSESSMENT:   Follow-up visit for:  Problem List Items Addressed This Visit    None  Visit Diagnoses       Left foot pain        Closed fracture of proximal phalanx of lesser toe of left foot, initial encounter        Relevant Orders    XR foot left 3+ views             PLAN: At this time we will offer the patient the ability to wean out of adrian taping return to regular shoes and footwear as tolerated recommend she utilize the postop shoe instead of being barefoot when walking around the house encouraged her to take it on her trip for vacation should she need it due to increased walking and activities.  She should call or return with any  worsening issues or concerns and continue with her vitamin D supplementation going forward.  Orders Placed This Encounter    XR foot left 3+ views           At the conclusion of the visit there were no further questions by the patient/family regarding their plan of care.  Patient was instructed to call or return with any issues, questions, or concerns regarding their injury and/or treatment plan described above.     01/22/25 at 3:19 PM - Cole C Budinsky, MD    Office: [phone]    This note was prepared using voice recognition software.  The details of this note are correct and have been reviewed, and corrected to the best of my ability.  Some grammatical errors may persist related to the Dragon software.

## 2025-01-29 RX ORDER — SEMAGLUTIDE 1.7 MG/.75ML
1.7 INJECTION, SOLUTION SUBCUTANEOUS WEEKLY
Qty: 3 ML | Refills: 0 | Status: SHIPPED | OUTPATIENT
Start: 2025-01-29 | End: 2025-03-20

## 2025-01-29 NOTE — TELEPHONE ENCOUNTER
Hello,        I am requesting a refill of Wegovy. I am doing fine with it.     Thank You,  iVanney Jacques

## 2025-03-06 NOTE — PROGRESS NOTES
Subjective   Patient ID: Vianney Jacques is a 49 y.o. female who presents for Annual Exam.  HPI    Patient presents in office today for an Annual physical. Last eye exam a year ago, last dental exam 3 months ago. Father has heart disease and mother has cancer. Does not need paperwork filled out today.    Patient is currently taking Wegovy 1.7 mg. Tolerating well. Last in office weight was 179 lb. Today's in office weight is 168 lbs. She has lost approximately 11 lbs. She takes Metamucil every day to prevent constipation. Her acid reflux is well-controlled on omeprazole.    Mood and anxiety have been doing well with the Viibryd.  May consider decreasing it in May to 20 mg brother is doing well    Due for mammogram; she would like to obtain this month.    Colonoscopy 4/19/2024, normal.      Review of systems  ; Patient seen today for exam denies any problems with headaches or vision, denies any shortness of breath chest pain nausea or vomiting, no black stool no blood in the stool no heartburn type symptoms denies any problems with constipation or diarrhea, and no dysuria-type symptoms    The patient's allergies medications were reviewed with them today    The patient's social family and surgical history or also reviewed here today, along with her past medical history.     Objective     Alert and active in  no acute distress  HEENT TMs clear oropharynx negative nares clear no drainage noted neck supple  With no adenopathy   Heart regular rate and rhythm without murmur and no carotid bruits  Lungs- clear to auscultation bilaterally, no wheeze or rhonchi noted  Thyroid -negative masses or nodularity  Abdomen- soft times four quadrants , bowel sounds positive no masses or organomegaly, negative tenderness guarding or rebound  Neurological exam unremarkable- DTRs in upper and lower extremities within normal limits.   skin -no lesions noted      /78 (BP Location: Left arm, Patient Position: Sitting, BP Cuff Size: Adult)   " Pulse 108   Temp 36.6 °C (97.8 °F) (Temporal)   Ht 1.676 m (5' 6\")   Wt 76.2 kg (168 lb)   SpO2 96%   BMI 27.12 kg/m²     No Known Allergies    Assessment/Plan   Problem List Items Addressed This Visit       Depression    Relevant Medications    vilazodone (Viibryd) 40 mg tablet    Routine general medical examination at a health care facility - Primary    Relevant Orders    Comprehensive Metabolic Panel    CBC and Auto Differential    Lipid Panel     Other Visit Diagnoses       Screening mammogram for breast cancer        Relevant Orders    BI mammo bilateral screening tomosynthesis    Anxiety        Relevant Medications    vilazodone (Viibryd) 40 mg tablet    BMI 27.0-27.9,adult        BMI 28.0-28.9,adult        Relevant Medications    semaglutide, weight loss, (Wegovy) 1.7 mg/0.75 mL pen injector            Labs have been ordered, she/he will have these performed and we will contact her/him with results.  (CBC, CMP, Lipid)    Refilled Viibryd, Wegovy.    Continue Wegovy 1.7 mg.   Will remain on the same dose if having early satiety. Will increase if the effect decreases.  Will discuss maintenance once she reaches BMI 25.     Increase fiber intake.   More apple and less banana.   Drink plenty of fluid.  Continue Metamucil every day.   Also recommended Benefiber 2 table spoons daily in the coffee to help with constipation.    May try reducing Viibryd to 1/2 tablet in summer as mood tends to get better with the weather.    Mammogram ordered today.     Discussed Shingrix and its side-effects with her today.   She is aware this is a 2-shot series.   Sh can get this in office or at a drug store.    If anything worsens or changes please call us at once, follow up in the office as planned,       Scribe Attestation  By signing my name below, INaima, Scribe   attest that this documentation has been prepared under the direction and in the presence of Lucien Freeman DO.  "

## 2025-03-07 ENCOUNTER — APPOINTMENT (OUTPATIENT)
Dept: PRIMARY CARE | Facility: CLINIC | Age: 50
End: 2025-03-07
Payer: COMMERCIAL

## 2025-03-07 VITALS
WEIGHT: 168 LBS | TEMPERATURE: 97.8 F | HEIGHT: 66 IN | OXYGEN SATURATION: 96 % | HEART RATE: 108 BPM | SYSTOLIC BLOOD PRESSURE: 108 MMHG | BODY MASS INDEX: 27 KG/M2 | DIASTOLIC BLOOD PRESSURE: 78 MMHG

## 2025-03-07 DIAGNOSIS — F41.9 ANXIETY: ICD-10-CM

## 2025-03-07 DIAGNOSIS — Z00.00 ROUTINE GENERAL MEDICAL EXAMINATION AT A HEALTH CARE FACILITY: Primary | ICD-10-CM

## 2025-03-07 DIAGNOSIS — F32.A DEPRESSION, UNSPECIFIED DEPRESSION TYPE: ICD-10-CM

## 2025-03-07 DIAGNOSIS — Z12.31 SCREENING MAMMOGRAM FOR BREAST CANCER: ICD-10-CM

## 2025-03-07 PROCEDURE — 3008F BODY MASS INDEX DOCD: CPT | Performed by: FAMILY MEDICINE

## 2025-03-07 PROCEDURE — 1036F TOBACCO NON-USER: CPT | Performed by: FAMILY MEDICINE

## 2025-03-07 PROCEDURE — 99396 PREV VISIT EST AGE 40-64: CPT | Performed by: FAMILY MEDICINE

## 2025-03-07 RX ORDER — SEMAGLUTIDE 1.7 MG/.75ML
1.7 INJECTION, SOLUTION SUBCUTANEOUS WEEKLY
Qty: 3 ML | Refills: 3 | Status: SHIPPED | OUTPATIENT
Start: 2025-03-07 | End: 2025-04-26

## 2025-03-07 RX ORDER — VILAZODONE HYDROCHLORIDE 40 MG/1
40 TABLET ORAL DAILY
Qty: 90 TABLET | Refills: 1 | Status: SHIPPED | OUTPATIENT
Start: 2025-03-07

## 2025-03-12 ENCOUNTER — APPOINTMENT (OUTPATIENT)
Dept: OBSTETRICS AND GYNECOLOGY | Facility: CLINIC | Age: 50
End: 2025-03-12
Payer: COMMERCIAL

## 2025-04-03 ENCOUNTER — APPOINTMENT (OUTPATIENT)
Dept: OBSTETRICS AND GYNECOLOGY | Facility: CLINIC | Age: 50
End: 2025-04-03
Payer: COMMERCIAL

## 2025-04-03 VITALS
WEIGHT: 165.9 LBS | BODY MASS INDEX: 26.66 KG/M2 | SYSTOLIC BLOOD PRESSURE: 122 MMHG | HEIGHT: 66 IN | DIASTOLIC BLOOD PRESSURE: 74 MMHG

## 2025-04-03 DIAGNOSIS — N94.9 FEMALE GENITAL LESION: ICD-10-CM

## 2025-04-03 DIAGNOSIS — N92.0 MENORRHAGIA WITH REGULAR CYCLE: ICD-10-CM

## 2025-04-03 DIAGNOSIS — Z01.411 ENCNTR FOR GYN EXAM (GENERAL) (ROUTINE) W ABNORMAL FINDINGS: Primary | ICD-10-CM

## 2025-04-03 PROCEDURE — 1036F TOBACCO NON-USER: CPT | Performed by: ADVANCED PRACTICE MIDWIFE

## 2025-04-03 PROCEDURE — 3008F BODY MASS INDEX DOCD: CPT | Performed by: ADVANCED PRACTICE MIDWIFE

## 2025-04-03 PROCEDURE — 87624 HPV HI-RISK TYP POOLED RSLT: CPT

## 2025-04-03 PROCEDURE — 99396 PREV VISIT EST AGE 40-64: CPT | Performed by: ADVANCED PRACTICE MIDWIFE

## 2025-04-03 ASSESSMENT — PATIENT HEALTH QUESTIONNAIRE - PHQ9
1. LITTLE INTEREST OR PLEASURE IN DOING THINGS: NOT AT ALL
SUM OF ALL RESPONSES TO PHQ9 QUESTIONS 1 & 2: 0
2. FEELING DOWN, DEPRESSED OR HOPELESS: NOT AT ALL

## 2025-04-03 NOTE — PROGRESS NOTES
"GYNECOLOGY PROGRESS NOTE        CC:  Patient here for her annual exam. No concerns about infections. She noted last week a small lump along the Right labia and she thinks it might have increased in size. She has noticed some bleeding from it too. Denies any hx of HSV or HPV. Agrees to pap today. Patient just started her menses. Menses have changed in the last year. She now notices heavy menses with clotting. The blood clots are increasing in size. Menses are monthly. D/O evaluation of heavy bleeding with clots and management based on results of sonogram. D/O possibly needing a hysteroscopic exam. No breast issues.   Chief Complaint   Patient presents with    Annual Exam        HPI:  Vianney Jacques is here for a routine GYN examination and for a \"lump\" on the right labia           ROS:  GI - no blood in BMs  URO - no hematuria  GYN - no AUB or vaginal discharge  PSYCH - mood OK        PHYSICAL EXAM:  /74 (BP Location: Left arm, Patient Position: Sitting, BP Cuff Size: Large adult)   Ht 1.676 m (5' 6\")   Wt 75.3 kg (165 lb 14.4 oz)   LMP 03/30/2025 (Exact Date)   BMI 26.78 kg/m²   GEN:  A&O, NAD  URO:  normal urethra, no bladder TTP  GYN:  normal vulva and perineum overall. Small what appears to be a blood clot or a labia lesion noted on the Right labia bleeding slightly, non tender, soft.  normal vagina with bloody discharge. normal cervix without lesions or discharge or CMT, uterus NT/NE, adnexa mobile and NT/NE  Physical Exam  Genitourinary:      Right Labia: lesions (blood clot verses a draining cyst, soft, non tender.).      Right Labia: No rash.     Left Labia: No lesions or rash.             BREAST:  no masses or TTP, no skin lesions or nipple discharge  PSYCH:  normal affect, non-anxious      IMPRESSION/PLAN:  A: On menses today. Small lesion noted along the Right labia. Normal mammogram. Change in menses x 1 year, no heavy with clotting.  Plan: 1. Pap. 2. Viral culture obtained from the lesion. 3. Has " mammogram ordered. 4. Sonogram due to heavy menses with clotting if wnl then scheduled hysteroscopic exam.   Problem List Items Addressed This Visit    None      Pap and HPV normal in 2021, no Hx of HGSIL.   ASCCP pap smear screening guidelines reviewed with the patient.      JULIO Fine

## 2025-04-06 LAB
HSV1 DNA SPEC QL NAA+PROBE: NOT DETECTED
HSV2 DNA SPEC QL NAA+PROBE: NOT DETECTED
SPECIMEN SOURCE: NORMAL

## 2025-04-11 ENCOUNTER — HOSPITAL ENCOUNTER (OUTPATIENT)
Dept: RADIOLOGY | Facility: CLINIC | Age: 50
Discharge: HOME | End: 2025-04-11
Payer: COMMERCIAL

## 2025-04-11 DIAGNOSIS — N92.0 MENORRHAGIA WITH REGULAR CYCLE: ICD-10-CM

## 2025-04-11 LAB
ALBUMIN SERPL-MCNC: 4.3 G/DL (ref 3.6–5.1)
ALP SERPL-CCNC: 52 U/L (ref 37–153)
ALT SERPL-CCNC: 13 U/L (ref 6–29)
ANION GAP SERPL CALCULATED.4IONS-SCNC: 7 MMOL/L (CALC) (ref 7–17)
AST SERPL-CCNC: 15 U/L (ref 10–35)
BASOPHILS # BLD AUTO: 38 CELLS/UL (ref 0–200)
BASOPHILS NFR BLD AUTO: 0.8 %
BILIRUB SERPL-MCNC: 0.5 MG/DL (ref 0.2–1.2)
BUN SERPL-MCNC: 14 MG/DL (ref 7–25)
CALCIUM SERPL-MCNC: 8.8 MG/DL (ref 8.6–10.4)
CHLORIDE SERPL-SCNC: 102 MMOL/L (ref 98–110)
CHOLEST SERPL-MCNC: 177 MG/DL
CHOLEST/HDLC SERPL: 2.1 (CALC)
CO2 SERPL-SCNC: 26 MMOL/L (ref 20–32)
CREAT SERPL-MCNC: 0.69 MG/DL (ref 0.5–1.03)
EGFRCR SERPLBLD CKD-EPI 2021: 105 ML/MIN/1.73M2
EOSINOPHIL # BLD AUTO: 38 CELLS/UL (ref 15–500)
EOSINOPHIL NFR BLD AUTO: 0.8 %
ERYTHROCYTE [DISTWIDTH] IN BLOOD BY AUTOMATED COUNT: 16.9 % (ref 11–15)
GLUCOSE SERPL-MCNC: 95 MG/DL (ref 65–99)
HCT VFR BLD AUTO: 33.4 % (ref 35–45)
HDLC SERPL-MCNC: 83 MG/DL
HGB BLD-MCNC: 10.2 G/DL (ref 11.7–15.5)
LDLC SERPL CALC-MCNC: 79 MG/DL (CALC)
LYMPHOCYTES # BLD AUTO: 1584 CELLS/UL (ref 850–3900)
LYMPHOCYTES NFR BLD AUTO: 33 %
MCH RBC QN AUTO: 23.2 PG (ref 27–33)
MCHC RBC AUTO-ENTMCNC: 30.5 G/DL (ref 32–36)
MCV RBC AUTO: 75.9 FL (ref 80–100)
MONOCYTES # BLD AUTO: 499 CELLS/UL (ref 200–950)
MONOCYTES NFR BLD AUTO: 10.4 %
NEUTROPHILS # BLD AUTO: 2640 CELLS/UL (ref 1500–7800)
NEUTROPHILS NFR BLD AUTO: 55 %
NONHDLC SERPL-MCNC: 94 MG/DL (CALC)
PLATELET # BLD AUTO: 202 THOUSAND/UL (ref 140–400)
PMV BLD REES-ECKER: 10.9 FL (ref 7.5–12.5)
POTASSIUM SERPL-SCNC: 4.3 MMOL/L (ref 3.5–5.3)
PROT SERPL-MCNC: 6.3 G/DL (ref 6.1–8.1)
RBC # BLD AUTO: 4.4 MILLION/UL (ref 3.8–5.1)
SODIUM SERPL-SCNC: 135 MMOL/L (ref 135–146)
TRIGL SERPL-MCNC: 69 MG/DL
WBC # BLD AUTO: 4.8 THOUSAND/UL (ref 3.8–10.8)

## 2025-04-11 PROCEDURE — 76856 US EXAM PELVIC COMPLETE: CPT

## 2025-04-14 ENCOUNTER — TELEPHONE (OUTPATIENT)
Dept: PRIMARY CARE | Facility: CLINIC | Age: 50
End: 2025-04-14
Payer: COMMERCIAL

## 2025-04-14 DIAGNOSIS — D64.9 ANEMIA, UNSPECIFIED TYPE: ICD-10-CM

## 2025-04-14 DIAGNOSIS — E53.8 VITAMIN B12 DEFICIENCY: ICD-10-CM

## 2025-04-14 DIAGNOSIS — E16.2 HYPOGLYCEMIA: ICD-10-CM

## 2025-04-14 RX ORDER — SEMAGLUTIDE 1.7 MG/.75ML
1.7 INJECTION, SOLUTION SUBCUTANEOUS WEEKLY
Qty: 3 ML | Refills: 3 | Status: SHIPPED | OUTPATIENT
Start: 2025-04-14 | End: 2025-07-29

## 2025-04-14 NOTE — TELEPHONE ENCOUNTER
Last seen 3/7/25  Medication pended      Vianney GARCES Do [id number] [facility] Clinical Support Staff (supporting Lucien Freeman DO)2 days ago       Hello,        I am requesting a refill of Wegovy. I am doing fine with it.     Thank You,  Vianney Jacques

## 2025-04-14 NOTE — TELEPHONE ENCOUNTER
----- Message from Lucien Freeman sent at 4/14/2025  7:43 AM EDT -----  Reviewed her labs her anemia is getting worse,, she needs iron, ferritin TIBC B12 folate, then follow-up with me, in 4 to 6 weeks,, I will also like her to start,, once a day iron supplement such as Slow Fe that she can buy at the drugstore,, start that iron after she gets these blood tests I I want her to have

## 2025-04-15 ENCOUNTER — TELEPHONE (OUTPATIENT)
Dept: OBSTETRICS AND GYNECOLOGY | Facility: CLINIC | Age: 50
End: 2025-04-15
Payer: COMMERCIAL

## 2025-04-15 LAB
ALBUMIN SERPL-MCNC: 4.3 G/DL (ref 3.6–5.1)
ALP SERPL-CCNC: 52 U/L (ref 37–153)
ALT SERPL-CCNC: 13 U/L (ref 6–29)
ANION GAP SERPL CALCULATED.4IONS-SCNC: 7 MMOL/L (CALC) (ref 7–17)
AST SERPL-CCNC: 15 U/L (ref 10–35)
BASOPHILS # BLD AUTO: 38 CELLS/UL (ref 0–200)
BASOPHILS NFR BLD AUTO: 0.8 %
BILIRUB SERPL-MCNC: 0.5 MG/DL (ref 0.2–1.2)
BUN SERPL-MCNC: 14 MG/DL (ref 7–25)
CALCIUM SERPL-MCNC: 8.8 MG/DL (ref 8.6–10.4)
CHLORIDE SERPL-SCNC: 102 MMOL/L (ref 98–110)
CHOLEST SERPL-MCNC: 177 MG/DL
CHOLEST/HDLC SERPL: 2.1 (CALC)
CO2 SERPL-SCNC: 26 MMOL/L (ref 20–32)
CREAT SERPL-MCNC: 0.69 MG/DL (ref 0.5–1.03)
EGFRCR SERPLBLD CKD-EPI 2021: 105 ML/MIN/1.73M2
EOSINOPHIL # BLD AUTO: 38 CELLS/UL (ref 15–500)
EOSINOPHIL NFR BLD AUTO: 0.8 %
ERYTHROCYTE [DISTWIDTH] IN BLOOD BY AUTOMATED COUNT: 16.9 % (ref 11–15)
FERRITIN SERPL-MCNC: 3 NG/ML (ref 16–232)
FOLATE SERPL-MCNC: 16.3 NG/ML
GLUCOSE SERPL-MCNC: 95 MG/DL (ref 65–99)
HCT VFR BLD AUTO: 33.4 % (ref 35–45)
HDLC SERPL-MCNC: 83 MG/DL
HGB BLD-MCNC: 10.2 G/DL (ref 11.7–15.5)
IRON SATN MFR SERPL: 4 % (CALC) (ref 16–45)
IRON SERPL-MCNC: 20 MCG/DL (ref 45–160)
LDLC SERPL CALC-MCNC: 79 MG/DL (CALC)
LYMPHOCYTES # BLD AUTO: 1584 CELLS/UL (ref 850–3900)
LYMPHOCYTES NFR BLD AUTO: 33 %
MCH RBC QN AUTO: 23.2 PG (ref 27–33)
MCHC RBC AUTO-ENTMCNC: 30.5 G/DL (ref 32–36)
MCV RBC AUTO: 75.9 FL (ref 80–100)
MONOCYTES # BLD AUTO: 499 CELLS/UL (ref 200–950)
MONOCYTES NFR BLD AUTO: 10.4 %
NEUTROPHILS # BLD AUTO: 2640 CELLS/UL (ref 1500–7800)
NEUTROPHILS NFR BLD AUTO: 55 %
NONHDLC SERPL-MCNC: 94 MG/DL (CALC)
PLATELET # BLD AUTO: 202 THOUSAND/UL (ref 140–400)
PMV BLD REES-ECKER: 10.9 FL (ref 7.5–12.5)
POTASSIUM SERPL-SCNC: 4.3 MMOL/L (ref 3.5–5.3)
PROT SERPL-MCNC: 6.3 G/DL (ref 6.1–8.1)
RBC # BLD AUTO: 4.4 MILLION/UL (ref 3.8–5.1)
SODIUM SERPL-SCNC: 135 MMOL/L (ref 135–146)
TIBC SERPL-MCNC: 446 MCG/DL (CALC) (ref 250–450)
TRIGL SERPL-MCNC: 69 MG/DL
VIT B12 SERPL-MCNC: 712 PG/ML (ref 200–1100)
WBC # BLD AUTO: 4.8 THOUSAND/UL (ref 3.8–10.8)

## 2025-04-15 NOTE — TELEPHONE ENCOUNTER
----- Message from Kristina Vazquez sent at 4/14/2025  3:52 PM EDT -----  Regarding: needs hysteroscopic exam scheduled.  This is what I sent the patient..... The Uterine lining is normal. There appears to be at least 1 fibroid at the top of the uterus. Fibroids can make menses heavy and sometimes can cause pain. Since you periods are heavier and with the fibroid being noted I would probably recommend that you have the hysteroscopic exam done. I will have a nurse reach out to discuss this with you  Kristina  ----- Message -----  From: Interface, Radiology Results In  Sent: 4/12/2025   6:28 PM EDT  To: JULIO Fine

## 2025-04-17 LAB
CYTOLOGY CMNT CVX/VAG CYTO-IMP: NORMAL
HPV HR 12 DNA GENITAL QL NAA+PROBE: NEGATIVE
HPV HR GENOTYPES PNL CVX NAA+PROBE: NEGATIVE
HPV16 DNA SPEC QL NAA+PROBE: NEGATIVE
HPV18 DNA SPEC QL NAA+PROBE: NEGATIVE
LAB AP HPV GENOTYPE QUESTION: YES
LAB AP HPV HR: NORMAL
LABORATORY COMMENT REPORT: NORMAL
LABORATORY COMMENT REPORT: NORMAL
LMP START DATE: NORMAL
PATH REPORT.TOTAL CANCER: NORMAL

## 2025-05-05 RX ORDER — SEMAGLUTIDE 1.7 MG/.75ML
1.7 INJECTION, SOLUTION SUBCUTANEOUS WEEKLY
Qty: 3 ML | Refills: 3 | Status: SHIPPED | OUTPATIENT
Start: 2025-05-05 | End: 2025-08-19

## 2025-05-05 NOTE — TELEPHONE ENCOUNTER
Vianney GARCES Do [id number] [facility] Clinical Support Staff (supporting Lucien Freeman DO) (Selected Message)        5/5/25 10:05 AM  Hello,        I need a refill of Wegovy . Also I am coming in on May 7th for a follow up for my low Iron, do I need to go for labs prior to my appt?     Thank You,  Vianney Jacques

## 2025-05-07 ENCOUNTER — APPOINTMENT (OUTPATIENT)
Dept: PRIMARY CARE | Facility: CLINIC | Age: 50
End: 2025-05-07
Payer: COMMERCIAL

## 2025-05-07 VITALS
HEART RATE: 98 BPM | SYSTOLIC BLOOD PRESSURE: 110 MMHG | TEMPERATURE: 97.8 F | BODY MASS INDEX: 27.03 KG/M2 | OXYGEN SATURATION: 98 % | DIASTOLIC BLOOD PRESSURE: 78 MMHG | WEIGHT: 168.2 LBS | HEIGHT: 66 IN

## 2025-05-07 DIAGNOSIS — D50.8 OTHER IRON DEFICIENCY ANEMIA: Primary | ICD-10-CM

## 2025-05-07 DIAGNOSIS — Z00.00 ROUTINE GENERAL MEDICAL EXAMINATION AT A HEALTH CARE FACILITY: ICD-10-CM

## 2025-05-07 DIAGNOSIS — N92.1 MENOMETRORRHAGIA: ICD-10-CM

## 2025-05-07 PROCEDURE — 99213 OFFICE O/P EST LOW 20 MIN: CPT | Performed by: FAMILY MEDICINE

## 2025-05-07 PROCEDURE — 3008F BODY MASS INDEX DOCD: CPT | Performed by: FAMILY MEDICINE

## 2025-05-07 NOTE — PROGRESS NOTES
"Subjective   Patient ID: Vianney Jacques is a 49 y.o. female who presents for anemia  HPI    Patient presents in office to review lab work from 4/11/25. Her hemoglobin and iron levels are low. Patient reports having heavy menstrual bleeding with clotting, and the clots has had an increase in size. She had a pelvic US done on 4/11/25 which confirmed 1.2 cm uterine fibroid without any evidence of ovarian mass or torsion. She admits to having symptoms of anemia, including dizziness while bending over and severe fatigue. Patient is taking iron enrich food.     Colonoscopy is up-to-date and with these periods being so irregular and heavy I think we have a reasonable reason    Patient is taking all the medications as prescribed. Medication list was reviewed with the patient.      Review of systems  ; Patient seen today for exam denies any problems with headaches or vision, denies any shortness of breath chest pain nausea or vomiting, no black stool no blood in the stool no heartburn type symptoms denies any problems with constipation or diarrhea, and no dysuria-type symptoms    The patient's allergies medications were reviewed with them today    The patient's social family and surgical history or also reviewed here today, along with her past medical history.     Objective     Alert and active in  no acute distress  HEENT TMs clear oropharynx negative nares clear no drainage noted neck supple  With no adenopathy   Heart regular rate and rhythm without murmur and no carotid bruits  Lungs- clear to auscultation bilaterally, no wheeze or rhonchi noted  Thyroid -negative masses or nodularity  Abdomen- soft times four quadrants , bowel sounds positive no masses or organomegaly, negative tenderness guarding or rebound    skin -no lesions noted      /78 (BP Location: Left arm, Patient Position: Sitting, BP Cuff Size: Adult long)   Pulse 98   Temp 36.6 °C (97.8 °F) (Temporal)   Ht 1.676 m (5' 6\")   Wt 76.3 kg (168 lb 3.2 oz) "   SpO2 98%   BMI 27.15 kg/m²     Allergies[1]    Assessment/Plan   Problem List Items Addressed This Visit       Routine general medical examination at a health care facility     Other Visit Diagnoses         Other iron deficiency anemia    -  Primary      BMI 27.0-27.9,adult          Menometrorrhagia                Reviewed CBC and Iron labs from 4/11/25, showing low hemoglobin and iron level.    Advised to increase iron consumption and add more iron-rich food in diet.    Increase fiber intake, more apple and less banana, drink plenty of fluid to prevent constipation due to high iron intake.    If anything worsens or changes please call us at once, follow up in the office as planned,       Scribe Attestation  By signing my name below, I, Samia Quiroz   attest that this documentation has been prepared under the direction and in the presence of Lucien Freeman DO.    All medical record entries made by the Scribe were at my direction and personally dictated by me.   I have reviewed the chart and agree that the record accurately reflects my personal performance of the history, physical exam, discussion, and plan.         [1] No Known Allergies

## 2025-05-08 ENCOUNTER — TELEPHONE (OUTPATIENT)
Dept: PRIMARY CARE | Facility: CLINIC | Age: 50
End: 2025-05-08

## 2025-05-08 NOTE — TELEPHONE ENCOUNTER
PLEASE ADVISE       Vianney Jacques to MIL[facility] Wang [id number] Clinical Support Staff (supporting Lucien Freeman DO) (Selected Message)        5/8/25  1:35 PM  I am unable to get an appointment for a Hysteroscopic Exam till mid June, in the meantime should I get an Iron infusion with my iron being so low?

## 2025-05-09 ENCOUNTER — TELEPHONE (OUTPATIENT)
Dept: OBSTETRICS AND GYNECOLOGY | Facility: CLINIC | Age: 50
End: 2025-05-09
Payer: COMMERCIAL

## 2025-06-19 ENCOUNTER — OFFICE VISIT (OUTPATIENT)
Dept: URGENT CARE | Age: 50
End: 2025-06-19
Payer: COMMERCIAL

## 2025-06-19 VITALS
RESPIRATION RATE: 18 BRPM | SYSTOLIC BLOOD PRESSURE: 114 MMHG | WEIGHT: 160 LBS | DIASTOLIC BLOOD PRESSURE: 76 MMHG | HEIGHT: 66 IN | BODY MASS INDEX: 25.71 KG/M2 | HEART RATE: 85 BPM | OXYGEN SATURATION: 98 % | TEMPERATURE: 98.1 F

## 2025-06-19 DIAGNOSIS — N30.01 ACUTE CYSTITIS WITH HEMATURIA: Primary | ICD-10-CM

## 2025-06-19 LAB
POC APPEARANCE, URINE: ABNORMAL
POC BILIRUBIN, URINE: NEGATIVE
POC BLOOD, URINE: ABNORMAL
POC COLOR, URINE: YELLOW
POC GLUCOSE, URINE: NEGATIVE MG/DL
POC KETONES, URINE: NEGATIVE MG/DL
POC LEUKOCYTES, URINE: ABNORMAL
POC NITRITE,URINE: NEGATIVE
POC PH, URINE: 7.5 PH
POC PROTEIN, URINE: NEGATIVE MG/DL
POC SPECIFIC GRAVITY, URINE: 1.01
POC UROBILINOGEN, URINE: 0.2 EU/DL
PREGNANCY TEST URINE, POC: NEGATIVE

## 2025-06-19 RX ORDER — SULFAMETHOXAZOLE AND TRIMETHOPRIM 800; 160 MG/1; MG/1
1 TABLET ORAL 2 TIMES DAILY
Qty: 10 TABLET | Refills: 0 | Status: SHIPPED | OUTPATIENT
Start: 2025-06-19 | End: 2025-06-24

## 2025-06-19 RX ORDER — SULFAMETHOXAZOLE AND TRIMETHOPRIM 800; 160 MG/1; MG/1
1 TABLET ORAL 2 TIMES DAILY
Qty: 10 TABLET | Refills: 0 | Status: SHIPPED | OUTPATIENT
Start: 2025-06-19 | End: 2025-06-19

## 2025-06-19 ASSESSMENT — ENCOUNTER SYMPTOMS
HEMATURIA: 1
DYSURIA: 0
BACK PAIN: 1
CHILLS: 0
NAUSEA: 1
ABDOMINAL PAIN: 0
FREQUENCY: 1
FEVER: 0
FLANK PAIN: 1
VOMITING: 0

## 2025-06-19 NOTE — PATIENT INSTRUCTIONS
You were diagnosed with urinary tract infection.     -You have been prescribed an antibiotic.  Please finish course of antibiotics, unless otherwise told by a provider.  -We will send your urine for culture. We will call you if your antibiotic doesn't treat the bacteria that grew in the culture.   -Take antibiotic with food, yogurt, or a probiotic. I recommend taking a probiotic while taking this medication, and for 7 days after you finish it.  A probiotic is a supplement you can buy over-the-counter that helps support the good bacteria in your body while taking antibiotics. Probiotics help to avoid the side effects of antibiotics, such as diarrhea or yeast infections. It is best to take a probiotic about 2 hours after your dose of antibiotic.   -If you do not feel relief in 24-36 hours, please return or call the clinic so we can change your antibiotic if necessary  -If your symptoms worsen, go to the emergency room for evaluation  -Phenazopyridine (available over the counter as AZO Standard or as a prescription, Pyridium) is for frequency, urgency and bladder spasm relief. It contains orange dye and will stain clothing so wear old underwear while taking. It also can cause nausea if not taken with food.    - Drink a lot of fluid, 3 to 4 quarts a day. Cranberry juice is especially recommended, in addition to large amounts of water.  - Avoid caffeine, tea and carbonated beverages (Coke®, 7-Up®, etc). They tend to irritate the bladder.  - Alcohol may irritate the prostate.  - Aspirin, ibuprofen (Advil® or Motrin®) or acetaminophen (Tylenol®) may be used for temperature and/or discomfort.  -Follow up with PCP within 1 week if symptoms worsen    TO PREVENT FURTHER INFECTIONS:  -Empty the bladder often. Avoid holding urine for long periods of time.  -After a bowel movement, women should cleanse from front to back. Use each tissue only once.  -Empty the bladder before and after sexual intercourse.  -If you develop back  pain, fever, nausea (feeling sick to your stomach), vomiting, or your symptoms (problems) are no better in 3 days, return to clinic. Return sooner if you are getting worse.  -You will be notified if your urine culture is positive.     Seek re-evaluation immediately if you develop:   -Develop severe back pain or lower abdominal pain.  -Unable to urinate.  -Develop chills and fever.  -Develop nausea or vomiting.  -Have continued burning or discomfort with urination.  -passing large clots.    You should follow up with your PCP or GYN if you have persistent or recurring symptoms.

## 2025-06-19 NOTE — PROGRESS NOTES
"Subjective   Patient ID: Vianney Jacques is a 49 y.o. female. They present today with a chief complaint of Illness (Possible UTI - Entered by patient X2 days).    History of Present Illness  -c/o concern for UTI   -endorses urinary frequency and intermittent hematuria since this morning  -has some supra-pubic pressure and left sided flank pain   -hx of ovarian cyst thinks the cyst was on the left, had recent ultra sound 4/2025  -she is scheduled for hysteroscopy on Monday, hx uterine fibroids and heavy menstrual bleeding   -she has never had UTI  -denies history of kidney stones  -denies concern for STI      History provided by:  Medical records and patient      Past Medical History  Allergies as of 06/19/2025    (No Known Allergies)       Prescriptions Prior to Admission[1]     Medical History[2]    Surgical History[3]     reports that she has never smoked. She has never used smokeless tobacco. She reports current alcohol use. She reports that she does not use drugs.    Review of Systems  Review of Systems   Constitutional:  Negative for chills and fever.   Gastrointestinal:  Positive for nausea. Negative for abdominal pain and vomiting.   Genitourinary:  Positive for flank pain, frequency, hematuria and pelvic pain. Negative for dysuria and urgency.   Musculoskeletal:  Positive for back pain.   All other systems reviewed and are negative.      Objective    Vitals:    06/19/25 1609   BP: 114/76   Pulse: 85   Resp: 18   Temp: 36.7 °C (98.1 °F)   SpO2: 98%   Weight: 72.6 kg (160 lb)   Height: 1.676 m (5' 6\")     Patient's last menstrual period was 06/01/2025.    Physical Exam  Vitals reviewed.   Constitutional:       Appearance: Normal appearance.   HENT:      Head: Normocephalic and atraumatic.   Cardiovascular:      Rate and Rhythm: Normal rate and regular rhythm.      Pulses: Normal pulses.      Heart sounds: No murmur heard.     No friction rub. No gallop.   Pulmonary:      Effort: Pulmonary effort is normal. No " "respiratory distress.   Abdominal:      General: Bowel sounds are normal.      Palpations: Abdomen is soft.      Tenderness: There is abdominal tenderness in the suprapubic area. There is no right CVA tenderness, left CVA tenderness, guarding or rebound.      Comments: -suprapubic pressure/discomfort with palpation   -has some L sided flank pain, which is not worsened with palpation   Musculoskeletal:      Cervical back: Normal range of motion and neck supple.   Skin:     General: Skin is warm.      Capillary Refill: Capillary refill takes less than 2 seconds.   Neurological:      General: No focal deficit present.      Mental Status: She is alert and oriented to person, place, and time.       /76   Pulse 85   Temp 36.7 °C (98.1 °F)   Resp 18   Ht 1.676 m (5' 6\")   Wt 72.6 kg (160 lb)   LMP 06/01/2025   SpO2 98%   BMI 25.82 kg/m²     Procedures    Point of Care Test & Imaging Results from this visit  Results for orders placed or performed in visit on 06/19/25   POCT UA Automated manually resulted   Result Value Ref Range    POC Color, Urine Yellow Straw, Yellow, Light-Yellow    POC Appearance, Urine Cloudy (A) Clear    POC Glucose, Urine NEGATIVE NEGATIVE mg/dl    POC Bilirubin, Urine NEGATIVE NEGATIVE    POC Ketones, Urine NEGATIVE NEGATIVE mg/dl    POC Specific Gravity, Urine 1.010 1.005 - 1.035    POC Blood, Urine LARGE (3+) (A) NEGATIVE    POC PH, Urine 7.5 No Reference Range Established PH    POC Protein, Urine NEGATIVE NEGATIVE mg/dl    POC Urobilinogen, Urine 0.2 0.2, 1.0 EU/DL    Poc Nitrite, Urine NEGATIVE NEGATIVE    POC Leukocytes, Urine LARGE (3+) (A) NEGATIVE   POCT pregnancy, urine manually resulted   Result Value Ref Range    Preg Test, Ur Negative Negative      Imaging  No results found.    Cardiology, Vascular, and Other Imaging  No other imaging results found for the past 2 days      Diagnostic study results (if any) were reviewed by Zoe Chacon PA-C.    Assessment/Plan "   Allergies, medications, history, and pertinent labs/EKGs/Imaging reviewed by Zoe Chacon PA-C.     Medical Decision Making  Urine pregnancy negative.  UA +3 leukest, 3+ blood, negative leukest --> consistent with acute cystitis with hematuria.  She has history of ovarian cyst (US transabdominal with transvaginal 4/12/25: showed uterine fibroid, and right ovarian cyst, normal left ovary).  She reports mild discomfort on the left side/flank/back.   Advised we can not rule out ruptured ovarian cyst, ovarian torsion, or kidney stones in the urgent care. She afebrile, non-toxic in appearance, HDS, and she has no peritoneal signs on exam, and there is low concern for sepsis or systemic illness.    Will send urine for culture and sensitivity.  Pt will be treated with bactrim and contacted if urine culture demonstrates resistance to antibiotic selected for treatment.      Risks, benefits, and alternatives of the medications and treatment plan prescribed today were discussed, and patient expressed understanding. Plan follow up as discussed or as needed if any worsening symptoms or change in condition. Reinforced red flags including (but not limited to):  inability to urinate; passing large clots; worsening abdominal, new/worsening abdominal/flank/back flank or back pain; shortness of breath; chest pain; and/or fever are indications to go to the Emergency Department.  She was advised close follow up with her PCP/GYN.  She has hysteroscopy scheduled for Monday, for her heavy menses, history of uterine fibroid.  Patient agreeable and voiced understanding of plan as outlined above. Patient and plan discussed with supervising physician who agrees with plan as outlined above.      We discussed with the patient our clinical thoughts at this time given the above findings and clinical assessment and we had a shared decision-making conversation in a patient-centered decision-making model on how to proceed forward. The patient  was instructed on the importance of a close follow-up with Primary Care Provider and other care providers. The patient was also advised that an Urgent care diagnosis is often a preliminary impression and that definitive care is often not able to be given completley in the Urgent care setting.    Orders and Diagnoses  Diagnoses and all orders for this visit:  Acute cystitis with hematuria  -     Urine Culture  -     POCT UA Automated manually resulted  -     POCT pregnancy, urine manually resulted  -     sulfamethoxazole-trimethoprim (Bactrim DS) 800-160 mg tablet; Take 1 tablet by mouth 2 times a day for 5 days.      Medical Admin Record      Patient disposition: Home    Electronically signed by Zoe Chacon PA-C  5:08 PM           [1] (Not in a hospital admission)   [2]   Past Medical History:  Diagnosis Date    Abnormal posture 03/09/2020    Abnormal posture    Acute maxillary sinusitis, unspecified 06/24/2020    Acute non-recurrent maxillary sinusitis    Contact with and (suspected) exposure to covid-19 09/17/2020    Exposure to COVID-19 virus    Contusion of unspecified front wall of thorax, initial encounter 02/24/2021    Rib contusion    Encounter for immunization 11/16/2020    Encounter for immunization    Encounter for screening for malignant neoplasm of colon 10/01/2021    Colon cancer screening    GERD (gastroesophageal reflux disease)     Other chest pain 06/02/2021    Atypical chest pain    Other conditions influencing health status 09/17/2020    History of cough    Other specified postprocedural states 11/09/2021    History of Papanicolaou smear    Personal history of other diseases of the musculoskeletal system and connective tissue 12/03/2021    History of joint pain    Personal history of other diseases of the musculoskeletal system and connective tissue 03/09/2020    History of muscle weakness    Personal history of other diseases of the musculoskeletal system and connective tissue 03/09/2020     History of neck pain    Personal history of other diseases of the respiratory system 2020    History of acute pharyngitis    Personal history of other specified conditions 2021    History of dizziness    Personal history of other specified conditions 10/01/2021    History of palpitations    Personal history of other specified conditions 2021    History of headache    Personal history of other specified conditions 10/01/2021    History of fatigue   [3]   Past Surgical History:  Procedure Laterality Date    OTHER SURGICAL HISTORY  2020     section    OTHER SURGICAL HISTORY  2020    Bunionectomy

## 2025-06-20 ENCOUNTER — APPOINTMENT (OUTPATIENT)
Dept: RADIOLOGY | Facility: HOSPITAL | Age: 50
End: 2025-06-20
Payer: COMMERCIAL

## 2025-06-20 DIAGNOSIS — Z12.31 SCREENING MAMMOGRAM FOR BREAST CANCER: ICD-10-CM

## 2025-06-20 PROCEDURE — 77067 SCR MAMMO BI INCL CAD: CPT

## 2025-06-21 LAB — BACTERIA UR CULT: ABNORMAL

## 2025-06-23 ENCOUNTER — APPOINTMENT (OUTPATIENT)
Dept: OBSTETRICS AND GYNECOLOGY | Facility: CLINIC | Age: 50
End: 2025-06-23
Payer: COMMERCIAL

## 2025-06-23 VITALS — DIASTOLIC BLOOD PRESSURE: 69 MMHG | WEIGHT: 161 LBS | BODY MASS INDEX: 25.99 KG/M2 | SYSTOLIC BLOOD PRESSURE: 102 MMHG

## 2025-06-23 DIAGNOSIS — N92.0 MENORRHAGIA WITH REGULAR CYCLE: ICD-10-CM

## 2025-06-23 LAB — PREGNANCY TEST URINE, POC: NEGATIVE

## 2025-06-23 PROCEDURE — 88305 TISSUE EXAM BY PATHOLOGIST: CPT

## 2025-06-23 RX ORDER — BUPIVACAINE HYDROCHLORIDE 2.5 MG/ML
10 INJECTION, SOLUTION INFILTRATION; PERINEURAL ONCE
Status: COMPLETED | OUTPATIENT
Start: 2025-06-23 | End: 2025-06-23

## 2025-06-23 RX ADMIN — BUPIVACAINE HYDROCHLORIDE 25 MG: 2.5 INJECTION, SOLUTION INFILTRATION; PERINEURAL at 15:15

## 2025-06-23 ASSESSMENT — PATIENT HEALTH QUESTIONNAIRE - PHQ9
2. FEELING DOWN, DEPRESSED OR HOPELESS: NOT AT ALL
1. LITTLE INTEREST OR PLEASURE IN DOING THINGS: NOT AT ALL
SUM OF ALL RESPONSES TO PHQ9 QUESTIONS 1 & 2: 0

## 2025-06-23 NOTE — LETTER
Hysteroscopy Aftercare Information    The procedure you just had included a visual inspection of the endocervix and endometrial cavity (the opening of the uterus and inside the uterus). This may have included an endometrial biopsy (a sample of tissue from inside the uterus) where appropriate.   The paracervical block (numbing medication injected into the cervix administered prior to the start of the procedure) will aid in reducing cramping typically for at least 8 hours. Sometimes an increase in symptoms occur as this numbing medication wears off.   The risk of complications from this procedure is extremely low.     Common post procedure symptoms include: spotting, irregular bleeding, cramping and abdominal pain. These symptoms are typically the most intense directly after the procedure but may continue over the next 2 weeks. The first period after this procedure may be different in terms of flow timing or duration.     Post procedure symptom management: For cramping and discomfort you may take   ? Ibuprofen 600 mg, every 6 hours Or   ? If you are unable to use NSAIDs or do not tolerate ibuprofen, you can use acetaminophen 650 mg every 6 hours.     Do not exceed 3 days of continuous dosing.     What to watch for:   ? high fevers over 101°,   ? severe abdominal pain that lasts over an hour   ? nausea and vomiting   ? Heavy bright red bleeding that is saturating one or more maxi pads per hour   ? Foul smelling discharge     If you experience any of the above please contact your provider to be evaluated.

## 2025-06-25 ENCOUNTER — TELEPHONE (OUTPATIENT)
Dept: PRIMARY CARE | Facility: CLINIC | Age: 50
End: 2025-06-25
Payer: COMMERCIAL

## 2025-06-25 DIAGNOSIS — R92.8 ABNORMAL MAMMOGRAM: Primary | ICD-10-CM

## 2025-06-25 NOTE — TELEPHONE ENCOUNTER
DR. BOWERS PATIENT - Covering while he is out of the office.      Diagnostic mammogram order is entered.

## 2025-06-25 NOTE — TELEPHONE ENCOUNTER
Please advise.       Vianney Jacques to MILI Wang [id number] [facility] Clinical Support Staff (supporting Lucien Freeman DO) (Selected Message)        6/25/25 10:15 AM  I received a letter that I need additional imaging after my mammogram but Dr. Kenyon needs to put the orders in for a diagnostic mammogram. Can that be done?     Thank You,  Vianney Jacques

## 2025-06-27 NOTE — TELEPHONE ENCOUNTER
----- Message from Zachariah Bob sent at 6/24/2025  7:46 PM EDT -----  Mammogram shows the left breast to be okay but they are recommending a diagnostic right mammogram to make sure everything is okay.  Please let her know and arrange.  ----- Message -----  From: Chitra Yee MA  Sent: 6/24/2025   4:47 PM EDT  To: Zachariah Bob MD      ----- Message -----  From: Marlene, Radiology Results In  Sent: 6/23/2025   5:01 PM EDT  To: Lucien Freeman DO

## 2025-07-01 LAB
LABORATORY COMMENT REPORT: NORMAL
PATH REPORT.FINAL DX SPEC: NORMAL
PATH REPORT.GROSS SPEC: NORMAL
PATH REPORT.RELEVANT HX SPEC: NORMAL
PATH REPORT.TOTAL CANCER: NORMAL

## 2025-07-02 ENCOUNTER — HOSPITAL ENCOUNTER (OUTPATIENT)
Dept: RADIOLOGY | Facility: CLINIC | Age: 50
Discharge: HOME | End: 2025-07-02
Payer: COMMERCIAL

## 2025-07-02 DIAGNOSIS — R92.8 ABNORMAL MAMMOGRAM: ICD-10-CM

## 2025-07-02 DIAGNOSIS — R92.8 OTHER ABNORMAL AND INCONCLUSIVE FINDINGS ON DIAGNOSTIC IMAGING OF BREAST: ICD-10-CM

## 2025-07-02 PROCEDURE — 77065 DX MAMMO INCL CAD UNI: CPT | Mod: RIGHT SIDE | Performed by: RADIOLOGY

## 2025-07-02 PROCEDURE — 77061 BREAST TOMOSYNTHESIS UNI: CPT | Mod: RT

## 2025-07-02 PROCEDURE — 77061 BREAST TOMOSYNTHESIS UNI: CPT | Mod: RIGHT SIDE | Performed by: RADIOLOGY

## 2025-07-02 NOTE — PROGRESS NOTES
GYN PROGRESS NOTE          CC:   AUB    HPI:  Patient answers are not available for this visit.  HPI       Procedure     Additional comments: New Pt, referred for hysteroscopy per Jigar             Comments    Vianney is a 49 year old Est. patient here today for an in office Hysteroscopy with EMB if indicated due to AUB, pt is having very heavy periods. PCP advised pt her iron was low. Pt had pelvic US 4/11.     I/O HCG obtained, see results.  Procedure reviewed with both the RN and MD and consent obtained prior to the start.    Time Out completed.  Pt tolerated procedure well.    Post-procedure instructions reviewed, copy given to pt. Pt verbalized understanding and denies questions or concerns.     Procedure chaperoned by SANDOVAL Castro           Last edited by Jojo Owens RN on 6/23/2025  2:10 PM.        Abnormal uterine bleeding and dysmenorrhea    Discussed evaluation including hysteroscopy and biopsy.    Discussed management including IUD ablation or hysterectomy.    Discussed risk of each procedure all questions answered to the best of my ability    ROS:  GEN - no fevers or chills  RESP - no SOB or cough  GYN - see HPI      HISTORY:  Medical History[1]  Surgical History[2]  Social History     Socioeconomic History    Marital status:      Spouse name: Not on file    Number of children: Not on file    Years of education: Not on file    Highest education level: Not on file   Occupational History    Not on file   Tobacco Use    Smoking status: Never    Smokeless tobacco: Never   Vaping Use    Vaping status: Never Used   Substance and Sexual Activity    Alcohol use: Yes     Comment: Socially    Drug use: Never    Sexual activity: Yes     Partners: Male     Birth control/protection: Male Sterilization   Other Topics Concern    Not on file   Social History Narrative    Not on file     Social Drivers of Health     Financial Resource Strain: Not on file   Food Insecurity: Not on file   Transportation Needs:  Not on file   Physical Activity: Not on file   Stress: Not on file   Social Connections: Not on file   Intimate Partner Violence: Not on file   Housing Stability: Not on file     Cancer-related family history includes Breast cancer in her niece and sister.       PHYSICAL EXAM:  /69   Wt 73 kg (161 lb)   LMP  (LMP Unknown)   BMI 25.99 kg/m²     General: No distress  Neck: No masses  Respiratory: No respiratory distress  Abdomen: soft nontender no hernias  GYN: Normal vulvar skin normal clitoral mckinney and clitoris labia majora and minora normal pink vaginal mucosa no lesions cervix normal uterine body not enlarged no enlargement or pain and bilateral adnexa   perianal area: without lesions    Procedure  Procedure office hysteroscopy  Dx: Uterine bleeding  Risks benefits alternatives discussed with the patient possible complications including bleeding pain and cramping infection perforation.  Consent was obtained.  Prior to the start of the procedure a timeout was performed.  The patient was confirmed using her name and date of birth.  The correct procedure to be performed was confirmed.  Positioning and equipment was verified.  Pregnancy test if under 55 was performed and found to be negative.  Patient was placed in dorsolithotomy position a bimanual exam was performed.  A speculum was placed in the vagina and the anterior cervix is identified this was grasped with a single-tooth tenaculum.  Paracervical block was placed injection of 10 mL quarter percent Marcaine without epinephrine.  After preparation with Betadine the cervix was dilated  a hysteroscope was placed under direct visualization through the endocervical and endometrial canal to the fundus survey visually was performed upon exit all surfaces were systematically inspected.     Resectoscope was placed to the identified anatomy.  Tissue including endocervix and endometrium was sampled complete resection was performed.    The instrumentation was  removed bleeding was noted to be scant patient tolerated the procedure well patient instructions postoperatively were given      IMPRESSION/PLAN:    49 y.o. abnormal uterine bleeding status post hysteroscopy endometrial biopsy    Follow-up to discuss results and planning      Risks benefits alternatives discussed with the patient risks including bleeding infection or damage to surrounding tissues.  Bleeding requiring blood transfusion transfusion reaction or infection.  Infection of the superficial or deep spaces.  Damage to bladder bowel ureters vascular structures abdominal wall.  Temporary or severe complications are possible. requiring further surgery acutely or with prolonged hospitalization including a return to the operating room.  All questions answered to the best my ability.    Nghia Henley MD         [1]   Past Medical History:  Diagnosis Date    Abnormal posture 03/09/2020    Abnormal posture    Acute maxillary sinusitis, unspecified 06/24/2020    Acute non-recurrent maxillary sinusitis    Breast cyst 2023    Contact with and (suspected) exposure to covid-19 09/17/2020    Exposure to COVID-19 virus    Contusion of unspecified front wall of thorax, initial encounter 02/24/2021    Rib contusion    Encounter for immunization 11/16/2020    Encounter for immunization    Encounter for screening for malignant neoplasm of colon 10/01/2021    Colon cancer screening    GERD (gastroesophageal reflux disease)     Other chest pain 06/02/2021    Atypical chest pain    Other conditions influencing health status 09/17/2020    History of cough    Other specified postprocedural states 11/09/2021    History of Papanicolaou smear    Personal history of other diseases of the musculoskeletal system and connective tissue 12/03/2021    History of joint pain    Personal history of other diseases of the musculoskeletal system and connective tissue 03/09/2020    History of muscle weakness    Personal history of other  diseases of the musculoskeletal system and connective tissue 2020    History of neck pain    Personal history of other diseases of the respiratory system 2020    History of acute pharyngitis    Personal history of other specified conditions 2021    History of dizziness    Personal history of other specified conditions 10/01/2021    History of palpitations    Personal history of other specified conditions 2021    History of headache    Personal history of other specified conditions 10/01/2021    History of fatigue    PONV (postoperative nausea and vomiting)    [2]   Past Surgical History:  Procedure Laterality Date     SECTION, LOW TRANSVERSE      OTHER SURGICAL HISTORY  2020     section    OTHER SURGICAL HISTORY  2020    Bunionectomy